# Patient Record
Sex: FEMALE | Race: WHITE | NOT HISPANIC OR LATINO | Employment: OTHER | URBAN - METROPOLITAN AREA
[De-identification: names, ages, dates, MRNs, and addresses within clinical notes are randomized per-mention and may not be internally consistent; named-entity substitution may affect disease eponyms.]

---

## 2017-02-17 ENCOUNTER — HOSPITAL ENCOUNTER (OUTPATIENT)
Dept: CARDIOLOGY | Facility: HOSPITAL | Age: 62
Discharge: HOME | End: 2017-02-17
Attending: SURGERY | Admitting: SURGERY

## 2017-05-26 ENCOUNTER — TRANSCRIBE ORDERS (OUTPATIENT)
Dept: ADMINISTRATIVE | Facility: HOSPITAL | Age: 62
End: 2017-05-26

## 2017-05-26 ENCOUNTER — LAB (OUTPATIENT)
Dept: LAB | Facility: HOSPITAL | Age: 62
End: 2017-05-26
Attending: SURGERY

## 2017-05-26 ENCOUNTER — HOSPITAL ENCOUNTER (OUTPATIENT)
Dept: CARDIOLOGY | Facility: HOSPITAL | Age: 62
Discharge: HOME | End: 2017-05-26
Attending: SURGERY | Admitting: SURGERY

## 2017-05-26 DIAGNOSIS — Z01.812 PRE-OPERATIVE LABORATORY EXAMINATION: ICD-10-CM

## 2017-05-26 DIAGNOSIS — Z01.812 PRE-OPERATIVE LABORATORY EXAMINATION: Primary | ICD-10-CM

## 2017-05-26 LAB
BASOPHILS # BLD AUTO: 0.03 10*3/MM3 (ref 0–0.2)
BASOPHILS NFR BLD AUTO: 0.5 % (ref 0–1.5)
DEPRECATED RDW RBC AUTO: 41.4 FL (ref 37–54)
EOSINOPHIL # BLD AUTO: 0.29 10*3/MM3 (ref 0–0.7)
EOSINOPHIL NFR BLD AUTO: 4.7 % (ref 0.3–6.2)
ERYTHROCYTE [DISTWIDTH] IN BLOOD BY AUTOMATED COUNT: 13.2 % (ref 11.7–13)
HCT VFR BLD AUTO: 41.4 % (ref 35.6–45.5)
HGB BLD-MCNC: 13.5 G/DL (ref 11.9–15.5)
IMM GRANULOCYTES # BLD: 0.01 10*3/MM3 (ref 0–0.03)
IMM GRANULOCYTES NFR BLD: 0.2 % (ref 0–0.5)
LYMPHOCYTES # BLD AUTO: 2.07 10*3/MM3 (ref 0.9–4.8)
LYMPHOCYTES NFR BLD AUTO: 33.7 % (ref 19.6–45.3)
MCH RBC QN AUTO: 28.3 PG (ref 26.9–32)
MCHC RBC AUTO-ENTMCNC: 32.6 G/DL (ref 32.4–36.3)
MCV RBC AUTO: 86.8 FL (ref 80.5–98.2)
MONOCYTES # BLD AUTO: 0.46 10*3/MM3 (ref 0.2–1.2)
MONOCYTES NFR BLD AUTO: 7.5 % (ref 5–12)
NEUTROPHILS # BLD AUTO: 3.28 10*3/MM3 (ref 1.9–8.1)
NEUTROPHILS NFR BLD AUTO: 53.4 % (ref 42.7–76)
NRBC BLD MANUAL-RTO: 0 /100 WBC (ref 0–0)
PLATELET # BLD AUTO: 120 10*3/MM3 (ref 140–500)
PMV BLD AUTO: 9.5 FL (ref 6–12)
RBC # BLD AUTO: 4.77 10*6/MM3 (ref 3.9–5.2)
WBC NRBC COR # BLD: 6.14 10*3/MM3 (ref 4.5–10.7)

## 2017-05-26 PROCEDURE — 93010 ELECTROCARDIOGRAM REPORT: CPT | Performed by: INTERNAL MEDICINE

## 2017-05-26 PROCEDURE — 93005 ELECTROCARDIOGRAM TRACING: CPT | Performed by: SURGERY

## 2017-05-26 PROCEDURE — 85025 COMPLETE CBC W/AUTO DIFF WBC: CPT

## 2017-05-26 PROCEDURE — 36415 COLL VENOUS BLD VENIPUNCTURE: CPT

## 2017-10-10 ENCOUNTER — HOSPITAL ENCOUNTER (OUTPATIENT)
Dept: CARDIOLOGY | Facility: HOSPITAL | Age: 62
Discharge: HOME | End: 2017-10-10
Attending: SURGERY | Admitting: SURGERY

## 2017-10-10 ENCOUNTER — LAB (OUTPATIENT)
Dept: LAB | Facility: HOSPITAL | Age: 62
End: 2017-10-10
Attending: SURGERY

## 2017-10-10 ENCOUNTER — TRANSCRIBE ORDERS (OUTPATIENT)
Dept: ADMINISTRATIVE | Facility: HOSPITAL | Age: 62
End: 2017-10-10

## 2017-10-10 DIAGNOSIS — I83.819 VARICOSE VEINS OF LOWER EXTREMITY WITH PAIN, UNSPECIFIED LATERALITY: Primary | ICD-10-CM

## 2017-10-10 DIAGNOSIS — I83.819 VARICOSE VEINS OF LOWER EXTREMITY WITH PAIN, UNSPECIFIED LATERALITY: ICD-10-CM

## 2017-10-10 DIAGNOSIS — Z01.818 PRE-OP TESTING: ICD-10-CM

## 2017-10-10 LAB
BASOPHILS # BLD AUTO: 0.04 10*3/MM3 (ref 0–0.2)
BASOPHILS NFR BLD AUTO: 0.7 % (ref 0–1.5)
DEPRECATED RDW RBC AUTO: 41.1 FL (ref 37–54)
EOSINOPHIL # BLD AUTO: 0.14 10*3/MM3 (ref 0–0.7)
EOSINOPHIL NFR BLD AUTO: 2.4 % (ref 0.3–6.2)
ERYTHROCYTE [DISTWIDTH] IN BLOOD BY AUTOMATED COUNT: 13.2 % (ref 11.7–13)
HCT VFR BLD AUTO: 42.2 % (ref 35.6–45.5)
HGB BLD-MCNC: 14 G/DL (ref 11.9–15.5)
IMM GRANULOCYTES # BLD: 0.01 10*3/MM3 (ref 0–0.03)
IMM GRANULOCYTES NFR BLD: 0.2 % (ref 0–0.5)
LYMPHOCYTES # BLD AUTO: 2.15 10*3/MM3 (ref 0.9–4.8)
LYMPHOCYTES NFR BLD AUTO: 37.2 % (ref 19.6–45.3)
MCH RBC QN AUTO: 28.3 PG (ref 26.9–32)
MCHC RBC AUTO-ENTMCNC: 33.2 G/DL (ref 32.4–36.3)
MCV RBC AUTO: 85.4 FL (ref 80.5–98.2)
MONOCYTES # BLD AUTO: 0.48 10*3/MM3 (ref 0.2–1.2)
MONOCYTES NFR BLD AUTO: 8.3 % (ref 5–12)
NEUTROPHILS # BLD AUTO: 2.96 10*3/MM3 (ref 1.9–8.1)
NEUTROPHILS NFR BLD AUTO: 51.2 % (ref 42.7–76)
NRBC BLD MANUAL-RTO: 0 /100 WBC (ref 0–0)
PLATELET # BLD AUTO: 129 10*3/MM3 (ref 140–500)
PMV BLD AUTO: 9.7 FL (ref 6–12)
RBC # BLD AUTO: 4.94 10*6/MM3 (ref 3.9–5.2)
WBC NRBC COR # BLD: 5.78 10*3/MM3 (ref 4.5–10.7)

## 2017-10-10 PROCEDURE — 85025 COMPLETE CBC W/AUTO DIFF WBC: CPT

## 2017-10-10 PROCEDURE — 36415 COLL VENOUS BLD VENIPUNCTURE: CPT

## 2017-10-10 PROCEDURE — 93010 ELECTROCARDIOGRAM REPORT: CPT | Performed by: INTERNAL MEDICINE

## 2017-10-10 PROCEDURE — 93005 ELECTROCARDIOGRAM TRACING: CPT | Performed by: SURGERY

## 2018-04-03 ENCOUNTER — ON CAMPUS - OUTPATIENT (AMBULATORY)
Dept: URBAN - METROPOLITAN AREA HOSPITAL 2 | Facility: HOSPITAL | Age: 63
End: 2018-04-03
Payer: COMMERCIAL

## 2018-04-03 ENCOUNTER — OFFICE (AMBULATORY)
Dept: URBAN - METROPOLITAN AREA PATHOLOGY 4 | Facility: PATHOLOGY | Age: 63
End: 2018-04-03

## 2018-04-03 VITALS
DIASTOLIC BLOOD PRESSURE: 110 MMHG | SYSTOLIC BLOOD PRESSURE: 106 MMHG | HEART RATE: 103 BPM | SYSTOLIC BLOOD PRESSURE: 98 MMHG | HEART RATE: 78 BPM | DIASTOLIC BLOOD PRESSURE: 76 MMHG | OXYGEN SATURATION: 96 % | SYSTOLIC BLOOD PRESSURE: 135 MMHG | HEART RATE: 79 BPM | SYSTOLIC BLOOD PRESSURE: 151 MMHG | DIASTOLIC BLOOD PRESSURE: 66 MMHG | RESPIRATION RATE: 16 BRPM | OXYGEN SATURATION: 98 % | DIASTOLIC BLOOD PRESSURE: 81 MMHG | HEART RATE: 101 BPM | OXYGEN SATURATION: 100 % | OXYGEN SATURATION: 97 % | DIASTOLIC BLOOD PRESSURE: 54 MMHG | SYSTOLIC BLOOD PRESSURE: 158 MMHG | OXYGEN SATURATION: 94 % | DIASTOLIC BLOOD PRESSURE: 75 MMHG | RESPIRATION RATE: 18 BRPM | DIASTOLIC BLOOD PRESSURE: 101 MMHG | HEART RATE: 72 BPM | DIASTOLIC BLOOD PRESSURE: 55 MMHG | OXYGEN SATURATION: 99 % | SYSTOLIC BLOOD PRESSURE: 114 MMHG | HEART RATE: 69 BPM | WEIGHT: 209 LBS | SYSTOLIC BLOOD PRESSURE: 105 MMHG | DIASTOLIC BLOOD PRESSURE: 38 MMHG | SYSTOLIC BLOOD PRESSURE: 101 MMHG | SYSTOLIC BLOOD PRESSURE: 137 MMHG | DIASTOLIC BLOOD PRESSURE: 53 MMHG | SYSTOLIC BLOOD PRESSURE: 194 MMHG | DIASTOLIC BLOOD PRESSURE: 86 MMHG | HEART RATE: 83 BPM | SYSTOLIC BLOOD PRESSURE: 111 MMHG | SYSTOLIC BLOOD PRESSURE: 130 MMHG | HEIGHT: 65 IN | HEART RATE: 74 BPM | HEART RATE: 68 BPM | RESPIRATION RATE: 20 BRPM | TEMPERATURE: 98 F

## 2018-04-03 DIAGNOSIS — Z86.010 PERSONAL HISTORY OF COLONIC POLYPS: ICD-10-CM

## 2018-04-03 DIAGNOSIS — D12.2 BENIGN NEOPLASM OF ASCENDING COLON: ICD-10-CM

## 2018-04-03 DIAGNOSIS — K29.50 UNSPECIFIED CHRONIC GASTRITIS WITHOUT BLEEDING: ICD-10-CM

## 2018-04-03 DIAGNOSIS — D12.3 BENIGN NEOPLASM OF TRANSVERSE COLON: ICD-10-CM

## 2018-04-03 DIAGNOSIS — K21.0 GASTRO-ESOPHAGEAL REFLUX DISEASE WITH ESOPHAGITIS: ICD-10-CM

## 2018-04-03 DIAGNOSIS — K64.8 OTHER HEMORRHOIDS: ICD-10-CM

## 2018-04-03 DIAGNOSIS — K57.30 DIVERTICULOSIS OF LARGE INTESTINE WITHOUT PERFORATION OR ABS: ICD-10-CM

## 2018-04-03 DIAGNOSIS — D12.5 BENIGN NEOPLASM OF SIGMOID COLON: ICD-10-CM

## 2018-04-03 PROBLEM — K20.9 ESOPHAGITIS, UNSPECIFIED: Status: ACTIVE | Noted: 2018-04-03

## 2018-04-03 PROBLEM — K29.70 GASTRITIS, UNSPECIFIED, WITHOUT BLEEDING: Status: ACTIVE | Noted: 2018-04-03

## 2018-04-03 LAB
GI HISTOLOGY: A. SELECT: (no result)
GI HISTOLOGY: B. UNSPECIFIED: (no result)
GI HISTOLOGY: C. UNSPECIFIED: (no result)
GI HISTOLOGY: D. UNSPECIFIED: (no result)
GI HISTOLOGY: E. UNSPECIFIED: (no result)
GI HISTOLOGY: PDF REPORT: (no result)

## 2018-04-03 PROCEDURE — 45385 COLONOSCOPY W/LESION REMOVAL: CPT | Mod: 33 | Performed by: INTERNAL MEDICINE

## 2018-04-03 PROCEDURE — 88305 TISSUE EXAM BY PATHOLOGIST: CPT | Mod: 33 | Performed by: INTERNAL MEDICINE

## 2018-04-03 PROCEDURE — 43239 EGD BIOPSY SINGLE/MULTIPLE: CPT | Mod: 59 | Performed by: INTERNAL MEDICINE

## 2018-04-03 RX ORDER — OMEPRAZOLE 20 MG/1
20 CAPSULE, DELAYED RELEASE ORAL
Qty: 30 | Refills: 11 | Status: ACTIVE
Start: 2018-04-03

## 2018-04-03 RX ADMIN — PROPOFOL: 10 INJECTION, EMULSION INTRAVENOUS at 10:50

## 2018-06-05 ENCOUNTER — OFFICE (AMBULATORY)
Dept: URBAN - METROPOLITAN AREA CLINIC 64 | Facility: CLINIC | Age: 63
End: 2018-06-05

## 2018-06-05 VITALS
WEIGHT: 200 LBS | DIASTOLIC BLOOD PRESSURE: 71 MMHG | HEIGHT: 65 IN | HEART RATE: 75 BPM | SYSTOLIC BLOOD PRESSURE: 136 MMHG

## 2018-06-05 DIAGNOSIS — K62.5 HEMORRHAGE OF ANUS AND RECTUM: ICD-10-CM

## 2018-06-05 PROCEDURE — 99213 OFFICE O/P EST LOW 20 MIN: CPT | Performed by: NURSE PRACTITIONER

## 2018-06-05 RX ORDER — HYDROCORTISONE ACETATE AND PRAMOXINE HYDROCHLORIDE 25; 10 MG/G; MG/G
CREAM TOPICAL
Qty: 1 | Refills: 2 | Status: ACTIVE
Start: 2018-06-05

## 2018-07-11 ENCOUNTER — OFFICE (AMBULATORY)
Dept: URBAN - METROPOLITAN AREA CLINIC 64 | Facility: CLINIC | Age: 63
End: 2018-07-11

## 2018-07-11 VITALS
HEART RATE: 72 BPM | DIASTOLIC BLOOD PRESSURE: 84 MMHG | SYSTOLIC BLOOD PRESSURE: 136 MMHG | WEIGHT: 211 LBS | HEIGHT: 65 IN

## 2018-07-11 DIAGNOSIS — K62.5 HEMORRHAGE OF ANUS AND RECTUM: ICD-10-CM

## 2018-07-11 PROCEDURE — 99212 OFFICE O/P EST SF 10 MIN: CPT | Performed by: INTERNAL MEDICINE

## 2019-06-04 ENCOUNTER — HOSPITAL ENCOUNTER (OUTPATIENT)
Dept: FAMILY MEDICINE CLINIC | Facility: CLINIC | Age: 64
Setting detail: SPECIMEN
Discharge: HOME | End: 2019-06-04
Attending: FAMILY MEDICINE | Admitting: FAMILY MEDICINE

## 2019-06-04 LAB
ALBUMIN SERPL-MCNC: 4 G/DL (ref 3.5–4.8)
ALBUMIN/GLOB SERPL: 1.5 {RATIO} (ref 1–1.7)
ALP SERPL-CCNC: 87 IU/L (ref 32–91)
ALT SERPL-CCNC: 41 IU/L (ref 14–54)
ANION GAP SERPL CALC-SCNC: 12.6 MMOL/L (ref 10–20)
AST SERPL-CCNC: 32 IU/L (ref 15–41)
BASOPHILS # BLD AUTO: 0 10*3/UL (ref 0–0.2)
BASOPHILS NFR BLD AUTO: 1 % (ref 0–2)
BILIRUB SERPL-MCNC: 1 MG/DL (ref 0.3–1.2)
BUN SERPL-MCNC: 12 MG/DL (ref 8–20)
BUN/CREAT SERPL: 20 (ref 5.4–26.2)
CALCIUM SERPL-MCNC: 10.7 MG/DL (ref 8.9–10.3)
CHLORIDE SERPL-SCNC: 108 MMOL/L (ref 101–111)
CHOLEST SERPL-MCNC: 116 MG/DL
CHOLEST/HDLC SERPL: 1.5 {RATIO}
CONV CO2: 24 MMOL/L (ref 22–32)
CONV LDL CHOLESTEROL DIRECT: 31 MG/DL (ref 0–100)
CONV MICROALBUM.,U,RANDOM: 4 MG/L
CONV TOTAL PROTEIN: 6.6 G/DL (ref 6.1–7.9)
CREAT 24H UR-MCNC: 121.8 MG/DL
CREAT UR-MCNC: 0.6 MG/DL (ref 0.4–1)
DIFFERENTIAL METHOD BLD: (no result)
EOSINOPHIL # BLD AUTO: 0.1 10*3/UL (ref 0–0.3)
EOSINOPHIL # BLD AUTO: 2 % (ref 0–3)
ERYTHROCYTE [DISTWIDTH] IN BLOOD BY AUTOMATED COUNT: 14.2 % (ref 11.5–14.5)
GLOBULIN UR ELPH-MCNC: 2.6 G/DL (ref 2.5–3.8)
GLUCOSE SERPL-MCNC: 106 MG/DL (ref 65–99)
HCT VFR BLD AUTO: 42 % (ref 35–49)
HDLC SERPL-MCNC: 76 MG/DL
HGB BLD-MCNC: 13.8 G/DL (ref 12–15)
LDLC/HDLC SERPL: 0.4 {RATIO}
LIPID INTERPRETATION: NORMAL
LYMPHOCYTES # BLD AUTO: 2.3 10*3/UL (ref 0.8–4.8)
LYMPHOCYTES NFR BLD AUTO: 39 % (ref 18–42)
MCH RBC QN AUTO: 28.1 PG (ref 26–32)
MCHC RBC AUTO-ENTMCNC: 33 G/DL (ref 32–36)
MCV RBC AUTO: 85.2 FL (ref 80–94)
MICROALBUMIN/CREAT UR: 3.3 UG/MG
MONOCYTES # BLD AUTO: 0.3 10*3/UL (ref 0.1–1.3)
MONOCYTES NFR BLD AUTO: 6 % (ref 2–11)
NEUTROPHILS # BLD AUTO: 3 10*3/UL (ref 2.3–8.6)
NEUTROPHILS NFR BLD AUTO: 52 % (ref 50–75)
NRBC BLD AUTO-RTO: 0 /100{WBCS}
NRBC/RBC NFR BLD MANUAL: 0 10*3/UL
PLATELET # BLD AUTO: 160 10*3/UL (ref 150–450)
PMV BLD AUTO: 8.4 FL (ref 7.4–10.4)
POTASSIUM SERPL-SCNC: 4.6 MMOL/L (ref 3.6–5.1)
RBC # BLD AUTO: 4.93 10*6/UL (ref 4–5.4)
SODIUM SERPL-SCNC: 140 MMOL/L (ref 136–144)
T4 FREE SERPL-MCNC: 1.03 NG/DL (ref 0.58–1.64)
TRIGL SERPL-MCNC: 51 MG/DL
TSH SERPL-ACNC: 1.48 UIU/ML (ref 0.34–5.6)
VIT B12 SERPL-MCNC: 245 PG/ML (ref 180–914)
VLDLC SERPL CALC-MCNC: 8.8 MG/DL
WBC # BLD AUTO: 5.8 10*3/UL (ref 4.5–11.5)

## 2019-06-05 LAB — 25(OH)D3 SERPL-MCNC: 28 NG/ML (ref 30–100)

## 2019-07-16 RX ORDER — BIMATOPROST 3 UG/ML
SOLUTION TOPICAL
Qty: 3 ML | Refills: 3 | Status: SHIPPED | OUTPATIENT
Start: 2019-07-16 | End: 2020-07-27 | Stop reason: SDUPTHER

## 2019-07-17 ENCOUNTER — OFFICE VISIT (OUTPATIENT)
Dept: FAMILY MEDICINE CLINIC | Facility: CLINIC | Age: 64
End: 2019-07-17

## 2019-07-17 VITALS
DIASTOLIC BLOOD PRESSURE: 89 MMHG | OXYGEN SATURATION: 100 % | RESPIRATION RATE: 18 BRPM | WEIGHT: 204 LBS | SYSTOLIC BLOOD PRESSURE: 179 MMHG | HEART RATE: 72 BPM | BODY MASS INDEX: 36.14 KG/M2 | HEIGHT: 63 IN | TEMPERATURE: 98.4 F

## 2019-07-17 DIAGNOSIS — I15.2 HYPERTENSION DUE TO ENDOCRINE DISORDER: ICD-10-CM

## 2019-07-17 DIAGNOSIS — E11.8 TYPE 2 DIABETES MELLITUS WITH COMPLICATION, UNSPECIFIED WHETHER LONG TERM INSULIN USE: Primary | ICD-10-CM

## 2019-07-17 DIAGNOSIS — Z87.19 HISTORY OF ABDOMINAL HERNIA: ICD-10-CM

## 2019-07-17 DIAGNOSIS — E66.01 CLASS 2 SEVERE OBESITY DUE TO EXCESS CALORIES WITH SERIOUS COMORBIDITY AND BODY MASS INDEX (BMI) OF 36.0 TO 36.9 IN ADULT (HCC): ICD-10-CM

## 2019-07-17 PROBLEM — K59.09 CONSTIPATION, CHRONIC: Status: ACTIVE | Noted: 2019-07-17

## 2019-07-17 PROBLEM — E53.8 DEFICIENCY OF OTHER SPECIFIED B GROUP VITAMINS: Status: ACTIVE | Noted: 2019-07-17

## 2019-07-17 PROBLEM — T50.905A ADVERSE DRUG REACTION: Status: ACTIVE | Noted: 2019-07-17

## 2019-07-17 PROBLEM — Z98.890 OTHER SPECIFIED POSTPROCEDURAL STATES: Status: ACTIVE | Noted: 2019-06-04

## 2019-07-17 PROBLEM — K76.0 STEATOSIS OF LIVER: Status: ACTIVE | Noted: 2019-07-17

## 2019-07-17 PROBLEM — K21.9 GASTROESOPHAGEAL REFLUX DISEASE: Status: ACTIVE | Noted: 2019-03-05

## 2019-07-17 PROBLEM — I10 HYPERTENSION: Status: ACTIVE | Noted: 2019-07-17

## 2019-07-17 PROBLEM — Z78.0 POSTMENOPAUSAL: Status: ACTIVE | Noted: 2019-06-04

## 2019-07-17 PROBLEM — E78.5 DYSLIPIDEMIA: Status: ACTIVE | Noted: 2019-06-04

## 2019-07-17 PROBLEM — N89.3 DYSPLASIA OF VAGINA: Status: ACTIVE | Noted: 2019-07-17

## 2019-07-17 PROBLEM — E03.9 HYPOTHYROIDISM: Status: ACTIVE | Noted: 2019-02-22

## 2019-07-17 PROBLEM — E11.9 TYPE 2 DIABETES MELLITUS (HCC): Status: ACTIVE | Noted: 2019-07-17

## 2019-07-17 LAB — GLUCOSE BLDC GLUCOMTR-MCNC: 114 MG/DL (ref 70–130)

## 2019-07-17 PROCEDURE — 82962 GLUCOSE BLOOD TEST: CPT | Performed by: FAMILY MEDICINE

## 2019-07-17 PROCEDURE — 99214 OFFICE O/P EST MOD 30 MIN: CPT | Performed by: FAMILY MEDICINE

## 2019-07-17 RX ORDER — AMLODIPINE BESYLATE 2.5 MG/1
1 TABLET ORAL EVERY 24 HOURS
COMMUNITY
Start: 2019-03-18 | End: 2019-11-14 | Stop reason: SDUPTHER

## 2019-07-17 RX ORDER — LOSARTAN POTASSIUM 50 MG/1
TABLET ORAL
COMMUNITY
Start: 2019-07-10 | End: 2019-08-05 | Stop reason: SDUPTHER

## 2019-07-17 RX ORDER — ASPIRIN 81 MG/1
81 TABLET ORAL DAILY
COMMUNITY
Start: 2012-03-19

## 2019-07-17 RX ORDER — LEVOTHYROXINE SODIUM 112 MCG
TABLET ORAL
COMMUNITY
Start: 2019-06-27 | End: 2019-10-24 | Stop reason: ALTCHOICE

## 2019-07-17 RX ORDER — METFORMIN HYDROCHLORIDE 500 MG/1
1 TABLET, EXTENDED RELEASE ORAL EVERY 24 HOURS
COMMUNITY
Start: 2019-03-05 | End: 2019-12-12 | Stop reason: SDUPTHER

## 2019-08-05 RX ORDER — LOSARTAN POTASSIUM 50 MG/1
50 TABLET ORAL 2 TIMES DAILY
Qty: 60 TABLET | Refills: 3 | Status: SHIPPED | OUTPATIENT
Start: 2019-08-05 | End: 2019-11-01 | Stop reason: ALTCHOICE

## 2019-08-11 PROBLEM — I15.2 HYPERTENSION DUE TO ENDOCRINE DISORDER: Status: ACTIVE | Noted: 2019-08-11

## 2019-08-11 PROBLEM — Z87.19 HISTORY OF ABDOMINAL HERNIA: Status: ACTIVE | Noted: 2019-08-11

## 2019-08-11 NOTE — PROGRESS NOTES
Subjective   Jack Hidalgo is a 63 y.o. female.    and is doing well  Problem   Hypertension Due to Endocrine Disorder   History of Abdominal Hernia   Class 2 Severe Obesity Due to Excess Calories With Serious Comorbidity and Body Mass Index (Bmi) of 36.0 to 36.9 in Adult (Cms/Hcc)     History of Present Illness   Pt recently had repair of previous abdominal hernia surgery  DM doing well with current treatment  Glucose gotky=102    The following portions of the patient's history were reviewed and updated as appropriate: allergies, current medications, past family history, past medical history, past social history, past surgical history and problem list.    Past Medical History:   Diagnosis Date   • B12 deficiency    • Biliary dyskinesia 09/2012   • Chronic constipation    • Colon polyps    • Fatty liver disease, nonalcoholic     Abstraction from [employer] Fatty [facility]   • GERD (gastroesophageal reflux disease)    • Hearing loss, bilateral     Bilateral hearing aids   • Hypertension    • Hypothyroidism    • Morbid obesity (CMS/HCC)    • Type 2 diabetes mellitus (CMS/HCC)    • Vulvar intraepithelial neoplasia ([facility]NE[facility])     Her GYN is Dr Garcia       Past Surgical History:   Procedure Laterality Date   • BLADDER REPAIR     • BREAST SURGERY      Breast Lift   • CHOLECYSTECTOMY  09/28/2012    Lap   • COLONOSCOPY      with 9 polyps 2 were pre cancerous Dr Guzman   • HERNIA REPAIR  2018    Double Hernias- [facility]   • HYSTERECTOMY     • LIPOMA EXCISION      Lipoma & Sub Cyst   • LIPOSUCTION     • OTHER SURGICAL HISTORY  2011    Vaginal Ablation ([facility])   • OTHER SURGICAL HISTORY Right 2012    Leg Ablation   • VEIN SURGERY      Vein Removal       Family History   Problem Relation Age of Onset   • Cancer Mother         Colon Cancer   • Diabetes Father         DMII   • Kidney disease Father    • Liver disease Sister    • Other Brother         MRSA   • Cancer Brother         Facial and Neck Cancer   •  Rheumatic fever Other    • Hypertension Other        Review of Systems   Constitutional: Negative for fatigue, unexpected weight gain and unexpected weight loss.   HENT: Negative for congestion, sinus pressure and sore throat.    Eyes: Negative for blurred vision and visual disturbance.   Respiratory: Negative for cough, shortness of breath and wheezing.    Cardiovascular: Negative for chest pain, palpitations and leg swelling.   Gastrointestinal: Negative for abdominal pain, constipation, diarrhea, nausea, vomiting, GERD and indigestion.   Musculoskeletal: Negative for arthralgias, back pain, gait problem, joint swelling and neck pain.   Skin: Negative for rash, skin lesions and bruise.   Allergic/Immunologic: Negative for environmental allergies.   Neurological: Negative for dizziness, tremors, syncope, weakness, light-headedness, headache and memory problem.   Psychiatric/Behavioral: Negative for sleep disturbance, depressed mood and stress. The patient is not nervous/anxious.        Objective   Physical Exam   Constitutional: She is oriented to person, place, and time. She appears well-developed and well-nourished. No distress.   obese   HENT:   Head: Normocephalic and atraumatic.   Right Ear: External ear normal.   Left Ear: External ear normal.   Nose: Nose normal.   Mouth/Throat: Oropharynx is clear and moist.   Eyes: EOM are normal. Pupils are equal, round, and reactive to light.   Neck: Normal range of motion. Neck supple. No thyromegaly present.   Cardiovascular: Normal rate, regular rhythm and normal heart sounds. Exam reveals no gallop and no friction rub.   No murmur heard.  Pulmonary/Chest: Effort normal. She has no wheezes.   Abdominal: Soft. There is no tenderness.   Healing post surgical wound   Musculoskeletal: Normal range of motion. She exhibits no edema, tenderness or deformity.   Lymphadenopathy:     She has no cervical adenopathy.   Neurological: She is alert and oriented to person, place, and  time. No cranial nerve deficit.   Skin: Skin is warm and dry. No rash noted. She is not diaphoretic.   Psychiatric: She has a normal mood and affect. Her behavior is normal. Judgment and thought content normal.   Nursing note and vitals reviewed.        Assessment/Plan   Jack was seen today for follow-up and diabetes.    Diagnoses and all orders for this visit:    Type 2 diabetes mellitus with complication, unspecified whether long term insulin use (CMS/[facility])  -     POCT Glucose    History of abdominal hernia  -     CT Abd With Contrast Pelvis With & Without Contrast    Hypertension due to endocrine disorder    Class 2 severe obesity due to excess calories with serious comorbidity and body mass index (BMI) of 36.0 to 36.9 in adult (CMS/[facility])          Keep fu with surgeon  Continue diabetes control  Refill meds as needed  Recommend weight loss for good health     Chief Complaint   Patient presents with   • Follow-up     follow up from hernia surgery   • Diabetes     Vitals:    07/17/19 1539   BP: 179/89   Pulse: 72   Resp: 18   Temp: 98.4 °F (36.9 °C)   SpO2: 100%     Glucose   Date Value Ref Range Status   07/17/2019 114 70 - 130 mg/dL Final     LDL Cholesterol    Date Value Ref Range Status   06/04/2019 31 0 - 100 mg/dL Final

## 2019-09-17 DIAGNOSIS — Z87.19 HISTORY OF ABDOMINAL HERNIA: Primary | ICD-10-CM

## 2019-09-19 ENCOUNTER — OFFICE VISIT (OUTPATIENT)
Dept: FAMILY MEDICINE CLINIC | Facility: CLINIC | Age: 64
End: 2019-09-19

## 2019-09-19 VITALS
BODY MASS INDEX: 36.86 KG/M2 | HEART RATE: 66 BPM | SYSTOLIC BLOOD PRESSURE: 183 MMHG | OXYGEN SATURATION: 99 % | WEIGHT: 208 LBS | HEIGHT: 63 IN | DIASTOLIC BLOOD PRESSURE: 92 MMHG | RESPIRATION RATE: 17 BRPM | TEMPERATURE: 97 F

## 2019-09-19 DIAGNOSIS — Z13.820 OSTEOPOROSIS SCREENING: ICD-10-CM

## 2019-09-19 DIAGNOSIS — E11.8 TYPE 2 DIABETES MELLITUS WITH COMPLICATION, UNSPECIFIED WHETHER LONG TERM INSULIN USE: Primary | ICD-10-CM

## 2019-09-19 PROBLEM — R10.32 LEFT LOWER QUADRANT PAIN: Status: ACTIVE | Noted: 2019-09-19

## 2019-09-19 PROBLEM — R10.9 LEFT SIDED ABDOMINAL PAIN: Status: ACTIVE | Noted: 2019-09-19

## 2019-09-19 LAB
GLUCOSE BLDC GLUCOMTR-MCNC: 114 MG/DL (ref 70–130)
HBA1C MFR BLD: 7.1 %

## 2019-09-19 PROCEDURE — 83036 HEMOGLOBIN GLYCOSYLATED A1C: CPT | Performed by: NURSE PRACTITIONER

## 2019-09-19 PROCEDURE — 99213 OFFICE O/P EST LOW 20 MIN: CPT | Performed by: NURSE PRACTITIONER

## 2019-09-19 PROCEDURE — 82962 GLUCOSE BLOOD TEST: CPT | Performed by: NURSE PRACTITIONER

## 2019-10-02 NOTE — PROGRESS NOTES
Chief Complaint   Patient presents with   • Follow-up   • Hypertension   • Diabetes        Subjective   Jack Hidalgo is a 63 y.o.  female who presents today for   Pt reports that she is having continued left lower abdominal pain. Pt also reports that she is concerned she is going to have a bowel obstruction because of pain. This is concerning as she has had history of  Abdominal hernias. Will need to obtain a CT scan to assure no bowel obstruction is present. Preventative dexa scan, mammograms and colonscopy discussed with patient reports complaint with these items. Will refer for dexa scan. Reports a medication adjustment was just made by Dr Vallecillo and she is to let us know if these do not drop her blood pressure. Discussed the blood pressure at today visit. Recommendations to monitor these daily and to bring to office in two weeks for evaluation.       Jack Hidalgo  has a past medical history of B12 deficiency, Biliary dyskinesia (09/2012), Chronic constipation, Colon polyps, Fatty liver disease, nonalcoholic, GERD (gastroesophageal reflux disease), Hearing loss, bilateral, Hypertension, Hypothyroidism, Morbid obesity (CMS/HCC), Type 2 diabetes mellitus (CMS/HCC), and Vulvar intraepithelial neoplasia ([facility]Z).    Allergies   Allergen Reactions   • Butorphanol Anaphylaxis   • Cephalexin Hives   • Lorazepam Hives   • Penicillin G Hives       Current Outpatient Medications:   •  amLODIPine (NORVASC) 2.5 MG tablet, 1 tablet Daily., Disp: , Rfl:   •  aspirin (ADULT ASPIRIN EC LOW STRENGTH) 81 MG EC tablet, ADULT ASPIRIN EC LOW STRENGTH 81 MG ORAL TABLET DELAYED RELEASE, Disp: , Rfl:   •  bimatoprost (LATISSE) 0.03 % ophthalmic solution, APPLY A LINE TO UPPER LID ONLY, Disp: 3 mL, Rfl: 3  •  glucose blood (FREESTYLE TEST STRIPS) test strip, FREESTYLE TEST STRP, Disp: , Rfl:   •  losartan (COZAAR) 50 MG tablet, Take 1 tablet by mouth 2 (Two) Times a Day., Disp: 60 tablet, Rfl: 3  •  metFORMIN ER (GLUCOPHAGE-XR)  500 MG 24 hr tablet, 1 tablet Daily., Disp: , Rfl:   •  SYNTHROID 112 MCG tablet, , Disp: , Rfl:   Past Medical History:   Diagnosis Date   • B12 deficiency    • Biliary dyskinesia 09/2012   • Chronic constipation    • Colon polyps    • Fatty liver disease, nonalcoholic    • GERD (gastroesophageal reflux disease)    • Hearing loss, bilateral    • Hypertension    • Hypothyroidism    • Morbid obesity (CMS/HCC)    • Type 2 diabetes mellitus (CMS/HCC)    • Vulvar intraepithelial neoplasia ([facility]Z)      Past Surgical History:   Procedure Laterality Date   • BLADDER REPAIR     • BREAST SURGERY      Breast Lift   • CHOLECYSTECTOMY  09/28/2012    Lap   • COLONOSCOPY      with 9 polyps 2 were pre cancerous Dr Guzman   • HERNIA REPAIR  2018    Double Hernias- [facility]   • HYSTERECTOMY     • LIPOMA EXCISION      Lipoma & Sub Cyst   • LIPOSUCTION     • OTHER SURGICAL HISTORY  2011    V[facility]inal Ablation ([facility])   • OTHER SURGICAL HISTORY Right 2012    Leg Ablation   • VEIN SURGERY      Vein Removal     Social History     Socioeconomic History   • Marital status:      Spouse name: Not on file   • Number of children: Not on file   • Years of education: Not on file   • Highest education level: Not on file   Tobacco Use   • Smoking status: Never Smoker   • Tobacco comment: Passive Smoke: N   Substance and Sexual Activity   • Drug use: No     Family History   Problem Relation Age of Onset   • Cancer Mother         Colon Cancer   • Diabetes Father         DMII   • Kidney disease Father    • Liver disease Sister    • Other Brother         MRSA   • Cancer Brother         Facial and Neck Cancer   • Rheumatic fever Other    • Hypertension Other      PHQ-2 Depression Screening  Little interest or pleasure in doing things?     Feeling down, depressed, or hopeless?     PHQ-2 Total Score       PHQ-9 Depression Screening  Little interest or pleasure in doing things?     Feeling down, depressed, or hopeless?     Trouble falling  "or staying asleep, or sleeping too much?     Feeling tired or having little energy?     Poor appetite or overeating?     Feeling bad about yourself - or that you are a failure or have let yourself or your family down?     Trouble concentrating on things, such as reading the newspaper or watching television?     Moving or speaking so slowly that other people could have noticed? Or the opposite - being so fidgety or restless that you have been moving around a lot more than usual?     Thoughts that you would be better off dead, or of hurting yourself in some way?     PHQ-9 Total Score     If you checked off any problems, how difficult have these problems made it for you to do your work, take care of things at home, or get along with other people?         Family history, surgical history, past medical history, Allergies and med's reviewed with patient today and updated in [employer] EMR.     ROS:  Review of Systems   Constitutional: Negative for activity change, appetite change and fatigue.   Respiratory: Negative for cough and shortness of breath.    Cardiovascular: Negative for chest pain and leg swelling.   Gastrointestinal: Negative for abdominal pain and nausea.   Endocrine: Negative for polydipsia, polyphagia and polyuria.   Musculoskeletal: Negative for arthralgias, back pain and myalgias.   Skin: Negative for rash.   Neurological: Negative for speech difficulty and headaches.   Psychiatric/Behavioral: Negative for confusion and decreased concentration.       OBJECTIVE:  Vitals:    09/19/19 1149   BP: (!) 183/92   Pulse: 66   Resp: 17   Temp: 97 °F (36.1 °C)   SpO2: 99%   Weight: 94.3 kg (208 lb)   Height: 160 cm (63\")     Body mass index is 36.85 kg/m².  Physical Exam   Constitutional: She is oriented to person, place, and time. She appears well-developed and well-nourished.   HENT:   Head: Normocephalic and atraumatic.   Eyes: Conjunctivae and EOM are normal. Pupils are equal, round, and reactive to light.   Neck: " Normal range of motion. Neck supple.   Cardiovascular: Normal rate, regular rhythm and normal heart sounds.   Pulmonary/Chest: Effort normal and breath sounds normal.   Abdominal: Soft. Bowel sounds are normal.   Musculoskeletal: Normal range of motion.   Neurological: She is alert and oriented to person, place, and time.   Skin: Skin is warm and dry.   Psychiatric: She has a normal mood and affect. Her behavior is normal. Judgment and thought content normal.       ASSESSMENT/ PLAN:    Jack was seen today for follow-up, hypertension and diabetes.    Diagnoses and all orders for this visit:    Type 2 diabetes mellitus with complication, unspecified whether long term insulin use (CMS/[facility])  -     POCT Glucose  -     POC Glycosylated Hemoglobin (Hb A1C)    Osteoporosis screening  -     DEXA Bone Density Axial        Orders Placed Today:     No orders of the defined types were placed in this encounter.       Management Plan:     An After Visit Summary was printed and given to the patient at discharge.    Follow-up: No Follow-up on file.    JW Ellison 10/2/2019 3:27 PM  This note was electronically signed.

## 2019-10-24 ENCOUNTER — OFFICE VISIT (OUTPATIENT)
Dept: FAMILY MEDICINE CLINIC | Facility: CLINIC | Age: 64
End: 2019-10-24

## 2019-10-24 VITALS
DIASTOLIC BLOOD PRESSURE: 89 MMHG | WEIGHT: 207 LBS | OXYGEN SATURATION: 99 % | HEART RATE: 70 BPM | BODY MASS INDEX: 36.67 KG/M2 | SYSTOLIC BLOOD PRESSURE: 151 MMHG | TEMPERATURE: 97.5 F

## 2019-10-24 DIAGNOSIS — R10.9 LEFT SIDED ABDOMINAL PAIN: Primary | ICD-10-CM

## 2019-10-24 PROCEDURE — 99213 OFFICE O/P EST LOW 20 MIN: CPT | Performed by: NURSE PRACTITIONER

## 2019-10-24 RX ORDER — PEN NEEDLE, DIABETIC 32GX 5/32"
NEEDLE, DISPOSABLE MISCELLANEOUS
COMMUNITY
Start: 2019-09-17 | End: 2019-12-12 | Stop reason: SDUPTHER

## 2019-10-24 RX ORDER — LIRAGLUTIDE 6 MG/ML
INJECTION SUBCUTANEOUS
COMMUNITY
Start: 2019-10-22 | End: 2020-02-10

## 2019-10-24 RX ORDER — LEVOTHYROXINE SODIUM 112 UG/1
112 TABLET ORAL DAILY
COMMUNITY
End: 2020-01-20

## 2019-11-01 ENCOUNTER — OFFICE VISIT (OUTPATIENT)
Dept: FAMILY MEDICINE CLINIC | Facility: CLINIC | Age: 64
End: 2019-11-01

## 2019-11-01 VITALS
RESPIRATION RATE: 18 BRPM | HEIGHT: 63 IN | OXYGEN SATURATION: 97 % | DIASTOLIC BLOOD PRESSURE: 83 MMHG | TEMPERATURE: 97.3 F | WEIGHT: 207 LBS | BODY MASS INDEX: 36.68 KG/M2 | SYSTOLIC BLOOD PRESSURE: 167 MMHG | HEART RATE: 70 BPM

## 2019-11-01 DIAGNOSIS — M81.0 AGE RELATED OSTEOPOROSIS, UNSPECIFIED PATHOLOGICAL FRACTURE PRESENCE: Primary | ICD-10-CM

## 2019-11-01 DIAGNOSIS — I10 ESSENTIAL HYPERTENSION: ICD-10-CM

## 2019-11-01 PROCEDURE — 99213 OFFICE O/P EST LOW 20 MIN: CPT | Performed by: NURSE PRACTITIONER

## 2019-11-01 RX ORDER — LISINOPRIL 10 MG/1
10 TABLET ORAL DAILY
Qty: 30 TABLET | Refills: 0 | Status: SHIPPED | OUTPATIENT
Start: 2019-11-01 | End: 2019-11-26 | Stop reason: SDUPTHER

## 2019-11-11 ENCOUNTER — TELEPHONE (OUTPATIENT)
Dept: ORTHOPEDIC SURGERY | Facility: CLINIC | Age: 64
End: 2019-11-11

## 2019-11-11 ENCOUNTER — OFFICE VISIT (OUTPATIENT)
Dept: ORTHOPEDIC SURGERY | Facility: CLINIC | Age: 64
End: 2019-11-11

## 2019-11-11 VITALS
HEIGHT: 66 IN | BODY MASS INDEX: 33.75 KG/M2 | HEART RATE: 72 BPM | SYSTOLIC BLOOD PRESSURE: 138 MMHG | WEIGHT: 210 LBS | DIASTOLIC BLOOD PRESSURE: 84 MMHG

## 2019-11-11 DIAGNOSIS — E55.9 VITAMIN D DEFICIENCY: ICD-10-CM

## 2019-11-11 DIAGNOSIS — Z82.62 FAMILY HX OSTEOPOROSIS: ICD-10-CM

## 2019-11-11 DIAGNOSIS — E83.52 HYPERCALCEMIA: ICD-10-CM

## 2019-11-11 DIAGNOSIS — M81.0 AGE-RELATED OSTEOPOROSIS WITHOUT CURRENT PATHOLOGICAL FRACTURE: Primary | ICD-10-CM

## 2019-11-11 PROBLEM — Z71.89 OTHER SPECIFIED COUNSELING: Status: ACTIVE | Noted: 2019-02-22

## 2019-11-11 PROCEDURE — 99204 OFFICE O/P NEW MOD 45 MIN: CPT | Performed by: PHYSICIAN ASSISTANT

## 2019-11-11 RX ORDER — MELATONIN
2000 DAILY
COMMUNITY
End: 2019-12-12 | Stop reason: SDUPTHER

## 2019-11-11 RX ORDER — BISACODYL 5 MG/1
5 TABLET, DELAYED RELEASE ORAL DAILY PRN
COMMUNITY
End: 2020-11-10 | Stop reason: ALTCHOICE

## 2019-11-11 RX ORDER — RISEDRONATE SODIUM 35 MG/1
35 TABLET, DELAYED RELEASE ORAL WEEKLY
Qty: 4 TABLET | Refills: 11 | Status: SHIPPED | OUTPATIENT
Start: 2019-11-11 | End: 2019-11-19 | Stop reason: ALTCHOICE

## 2019-11-11 NOTE — TELEPHONE ENCOUNTER
Your request has been successfully sent to [employer] for review. You may close this dialog, return to your dashboard, and perform other tasks.  To check for an update later, open this request again from your dashboard.  Your request will be reviewed within 24 hours. If needed, you may contact [employer] at [phone].      Garcia:[id number]

## 2019-11-11 NOTE — PATIENT INSTRUCTIONS
Osteoporosis    Osteoporosis happens when your bones get thin and weak. This can cause your bones to break (fracture) more easily. You can do things at home to make your bones stronger.  Follow these instructions at home:    Activity  · Exercise as told by your doctor. Ask your doctor what activities are safe for you. You should do:  ? Exercises that make your muscles work to hold your body weight up (weight-bearing exercises). These include shayan chi, yoga, and walking.  ? Exercises to make your muscles stronger. One example is lifting weights.  Lifestyle  · Limit alcohol intake to no more than 1 drink a day for nonpregnant women and 2 drinks a day for men. One drink equals 12 oz of beer, 5 oz of wine, or 1½ oz of hard liquor.  · Do not use any products that have nicotine or tobacco in them. These include cigarettes and e-cigarettes. If you need help quitting, ask your doctor.  Preventing falls  · Use tools to help you move around (mobility aids) as needed. These include canes, walkers, scooters, and crutches.  · Keep rooms well-lit and free of clutter.  · Put away things that could make you trip. These include cords and rugs.  · Install safety rails on stairs. Install grab bars in bathrooms.  · Use rubber mats in slippery areas, like bathrooms.  · Wear shoes that:  ? Fit you well.  ? Support your feet.  ? Have closed toes.  ? Have rubber soles or low heels.  · Tell your doctor about all of the medicines you are taking. Some medicines can make you more likely to fall.  General instructions  · Eat plenty of calcium and vitamin D. These nutrients are good for your bones. Good sources of calcium and vitamin D include:  ? Some fatty fish, such as salmon and tuna.  ? Foods that have calcium and vitamin D added to them (fortified foods). For example, some breakfast cereals are fortified with calcium and vitamin D.  ? Egg yolks.  ? Cheese.  ? Liver.  · Take over-the-counter and prescription medicines only as told by your  doctor.  · Keep all follow-up visits as told by your doctor. This is important.  Contact a doctor if:  · You have not been tested (screened) for osteoporosis and you are:  ? A woman who is age 65 or older.  ? A man who is age 70 or older.  Get help right away if:  · You fall.  · You get hurt.  Summary  · Osteoporosis happens when your bones get thin and weak.  · Weak bones can break (fracture) more easily.  · Eat plenty of calcium and vitamin D. These nutrients are good for your bones.  · Tell your doctor about all of the medicines that you take.  This information is not intended to replace advice given to you by your health care provider. Make sure you discuss any questions you have with your health care provider.  Document Released: 03/11/2013 Document Revised: 10/12/2018 Document Reviewed: 10/12/2018  [employer] Interactive Patient Education © 2019 [employer]      Preventing Health Risks of Being Overweight  Maintaining a healthy body weight is an important part of your overall health. Your healthy body weight depends on your age, gender, and height. Being overweight puts you at risk for many health problems, including:  · Heart disease.  · Diabetes.  · Problems sleeping.  · Joint problems.  You can make changes to your diet and lifestyle to prevent these risks. Consider working with a health care provider or a dietitian to make these changes.  What nutrition changes can be made?    · Eat only as much as your body needs. In most cases, this is about 2,000 calories a day, but the amount varies depending on your height, gender, and activity level. Ask your health care provider how many calories you should have each day. Eating more than your body needs on a regular basis can cause you to become overweight or obese.  · Eat slowly, and stop eating when you feel full.  · Choose healthy foods, including:  ? Fruits and vegetables.  ? Lean meats.  ? Low-fat dairy products.  ? High-fiber foods, such as whole grains and  beans.  ? Healthy snacks like vegetable sticks, a piece of fruit, or a small amount of yogurt or cheese.  · Avoid foods and drinks that are high in sugar, salt (sodium), saturated fat, or trans fat. This includes:  ? Many desserts such as candy, cookies, and ice cream.  ? Soda.  ? Fried foods.  ? Processed meats such as hot dogs or lunch meats.  ? Prepackaged snack foods.  What lifestyle changes can be made?    · Exercise for at least 150 minutes a week to prevent weight gain, or as often as recommended by your health care provider. Do moderate-intensity exercise, such as brisk walking.  ? Spread it out by exercising for 30 minutes 5 days a week, or in short 10-minute bursts several times a day.  · Find other ways to stay active and burn calories, such as yard work or a hobby that involves physical activity.  · Get at least 8 hours of sleep each night. When you are well-rested, you are more likely to be active and make healthy choices during the day. To sleep better:  ? Try to go to bed and wake up at about the same time every day.  ? Keep your bedroom dark, quiet, and cool.  ? Make sure that your bed is comfortable.  ? Avoid stimulating activities, such as watching television or exercising, for at least one hour before bedtime.  Why are these changes important?  Eating healthy and being active helps you lose weight and prevent health problems caused by being overweight. Making these changes can also help you manage stress, feel better mentally, and connect with friends and family.  What can happen if changes are not made?  Being overweight can affect you for your entire life. You may develop joint or bone problems that make it painful or difficult for you to play sports or do activities you enjoy. Being overweight puts stress on your heart and lungs and can lead to medical problems like diabetes, heart disease, and sleeping problems.  Where to find support  You can get support for preventing health risks of being  overweight from:  · Your health care provider or a dietitian. They can provide guidance about healthy eating and healthy lifestyle choices.  · Weight loss support groups, online or in-person.  Where to find more information  · MyPlate: www.choosemyplate.gov  ? This an online tool that provides personalized recommendations about foods to eat each day.  · The Centers for Disease Control and Prevention: www.cdc.gov/healthyweight  ? This resource gives tips for managing weight and having an active lifestyle.  Summary  · To prevent unhealthy weight gain, it is important to maintain a healthy diet high in vegetables and whole grains, exercise regularly, and get at least 8 hours of sleep each night.  · Making these changes helps prevent many long-term (chronic) health conditions that can shorten your life, such as diabetes, heart disease, and stroke.  This information is not intended to replace advice given to you by your health care provider. Make sure you discuss any questions you have with your health care provider.  Document Released: 11/14/2018 Document Revised: 11/14/2018 Document Reviewed: 11/14/2018  [email or web address] Interactive Patient Education © 2019 [employer]

## 2019-11-11 NOTE — PROGRESS NOTES
ORTHOPEDIC VISIT    Referring Provider: No ref. provider found  Primary Care Provider: Kayla Mendoza APRN         Subjective:  Chief Complaint:  Chief Complaint   Patient presents with   • Osteoporosis       HPI:  Jack Hidalgo is a 63 y.o. female who presents for initial visit for osteoporosis.  The patient complains of Complains of: Some joint pain.  And denies Denies: generalized bone pain, loss of height and Any fractures over the age of 50.  Their risk factors include risk factors: Vitamin D deficiency, Low sun exposure and Hypercalcemia.  The patient has the following associated illnesses: Associated illnesses: Esophagitis, Gastritis, Hypercalcemia, Vitamin D deficiency and Diabetes mellitus.  Treatment to date has included Treatment to date: Vitamin D supplementation and Weightbearing exercise.  The patient has not taken any osteoporosis medications.  She currently takes 2000 international units of D3 per day.  She does not take any calcium.  She denies any fractures over the age of 50.  She does have a family history of osteoporosis in her father.  She denies any history of cancer or kidney stones.  She has previously used PPIs for her GERD.  She does see a dentist regularly.  Recent labs were reviewed, showing hypercalcemia.  She did have a DEXA scan on 10/28/2019 at [facility] radiology, revealing a T score of -2.8 in the left femoral neck, with FRAX scores 13% and 2.8%.  Referred for consultation by Kayla Mendoza.        Past Medical History:   Diagnosis Date   • B12 deficiency    • Biliary dyskinesia 09/2012   • Chronic constipation    • Colon polyps    • Dyslipidemia 6/4/2019   • Fatty liver disease, nonalcoholic     Abstraction from [employer] [facility]   • Gastroesophageal reflux disease 3/5/2019   • GERD (gastroesophageal reflux disease)    • Hearing loss, bilateral     Bilateral hearing aids   • Hypertension    • Hypothyroidism    • Morbid obesity (CMS/HCC)    • Type 2  diabetes mellitus (CMS/HCC)    • Vulvar intraepithelial neoplasia (MARTÍNEZ)     Her GYN is Dr Garcia       Past Surgical History:   Procedure Laterality Date   • BLADDER REPAIR     • BREAST SURGERY      Breast Lift   • CHOLECYSTECTOMY  09/28/2012    Lap   • COLONOSCOPY      with 9 polyps 2 were pre cancerous Dr Guzman   • HERNIA REPAIR  2018    Double Hernias- [facility]   • HYSTERECTOMY     • LIPOMA EXCISION      Lipoma & Sub Cyst   • LIPOSUCTION     • OTHER SURGICAL HISTORY  2011    Vaginal Ablation ([facility])   • OTHER SURGICAL HISTORY Right 2012    Leg Ablation   • VEIN SURGERY      Vein Removal       Family History   Problem Relation Age of Onset   • Cancer Mother         Colon Cancer   • Diabetes Father         DMII   • Kidney disease Father    • Osteoporosis Father    • Liver disease Sister    • Other Brother         MRSA   • Cancer Brother         Facial and Neck Cancer   • Rheumatic fever Other    • Hypertension Other        Social History     Occupational History   • Not on file   Tobacco Use   • Smoking status: Never Smoker   • Smokeless tobacco: Never Used   • Tobacco comment: Passive Smoke: N   Substance and Sexual Activity   • Alcohol use: No     Frequency: Never   • Drug use: No   • Sexual activity: Not on file        Medications:    Current Outpatient Medications:   •  amLODIPine (NORVASC) 2.5 MG tablet, 1 tablet Daily., Disp: , Rfl:   •  aspirin (ADULT ASPIRIN EC LOW STRENGTH) 81 MG EC tablet, ADULT ASPIRIN EC LOW STRENGTH 81 MG ORAL TABLET DELAYED RELEASE, Disp: , Rfl:   •  BD PEN NEEDLE JENNIFER U/F 32G X 4 MM misc, , Disp: , Rfl:   •  bimatoprost (LATISSE) 0.03 % ophthalmic solution, APPLY A LINE TO UPPER LID ONLY, Disp: 3 mL, Rfl: 3  •  bisacodyl (DULCOLAX) 5 MG EC tablet, Take 5 mg by mouth Daily As Needed for Constipation., Disp: , Rfl:   •  cholecalciferol (VITAMIN D3) 25 MCG (1000 UT) tablet, Take 2,000 Units by mouth Daily., Disp: , Rfl:   •  glucose blood (FREESTYLE TEST STRIPS) test  "strip, FREESTYLE TEST STRP, Disp: , Rfl:   •  levothyroxine (SYNTHROID, LEVOTHROID) 112 MCG tablet, Take 112 mcg by mouth Daily., Disp: , Rfl:   •  lisinopril (PRINIVIL,ZESTRIL) 10 MG tablet, Take 1 tablet by mouth Daily for 30 days., Disp: 30 tablet, Rfl: 0  •  metFORMIN ER (GLUCOPHAGE-XR) 500 MG 24 hr tablet, 1 tablet Daily., Disp: , Rfl:   •  VICTOZA 18 MG/3ML solution pen-injector injection, , Disp: , Rfl:   •  Risedronate Sodium 35 MG tablet delayed-release, Take 35 mg by mouth 1 (One) Time Per Week. Take with water after breakfast & remain upright for 30 minutes, Disp: 4 tablet, Rfl: 11    Allergies:  Allergies   Allergen Reactions   • Butorphanol Anaphylaxis   • Cephalexin Hives   • Lorazepam Hives   • Penicillin G Hives         Review of Systems:  Gen -no fever, chills , sweats, headache   Eyes - no irritation or discharge   ENT -  no ear pain , runny nose , sore throat , difficulty swallowing   Resp - no cough , congestion , excessive expectoration   CVS - no chest pain , palpitations.   Abd - no pain , nausea , vomiting , diarrhea   Skin - no rash , lesions.   Neuro - no dizziness    Please see HPI for any other pertinent positives.  All other systems were reviewed and are negative.       Objective   Objective:    /84   Pulse 72   Ht 166.4 cm (65.5\")   Wt 95.3 kg (210 lb)   BMI 34.41 kg/m²     Physical Examination:  Obese individual in no acute distress, patient is alert and cooperative with the exam, appears to have normal mood and affect with a normal attention span and concentration, ambulating unassisted  normocephalic, atraumatic, extraocular movements intact, conjunctiva and sclera are clear without nystagmus, normal canals with grossly normal hearing, no nasal deformity, discharge, inflammation, or lesions, no mouth deformity or lesions  no lymphadenopathy or thyromegaly  normal respiratory effort  abdomen is nontender, without guarding or rebound and nondistended  no gross joint " abnormalities  pulses normal in all 4 extremities, no clubbing, cyanosis, or edema noted  cranial nerves II-XII grossly intact with no focal defects  skin intact without lesions or rashes visible             Imaging:  no diagnostic testing performed this visit            Assessment:  1. Age-related osteoporosis without current pathological fracture    2. Vitamin D deficiency    3. Hypercalcemia    4. Family hx osteoporosis                 Plan:  Today she will be given orders for CMP, magnesium, phosphorus, PTH, and vitamin D.  We discussed various treatment options today, and the patient would first like to try an oral medication.  She will be started on Atelvia.  We have discussed the risks and benefits of this medication, including but not limited to osteonecrosis of the jaw and atypical femur fractures, and she voiced understanding.  She will also be given an osteoporosis folder containing everything discussed, including weightbearing exercise and fall prevention.  I will plan to see her yearly and as needed.  PATIENT EDUCATION:    Education was provided to: patient and patient's spouse  Patient response: expressed understanding, receptive and May need additional review  Topics discussed: medical condition, workup results, treatment options, therapeutic risks and benefits, medication use, health promotion, diet and nutrition, support systems available, drug interactions, compliance with medication, osteoporosis risks, osteoporosis medications, Atelvia versus Reclast versus Prolia  Informed how: verbally, demonstration, literature  Minutes spent on education: 30             ANCELMO Jonas  11/11/19  2:36 PM

## 2019-11-12 ENCOUNTER — TELEPHONE (OUTPATIENT)
Dept: FAMILY MEDICINE CLINIC | Facility: CLINIC | Age: 64
End: 2019-11-12

## 2019-11-12 NOTE — TELEPHONE ENCOUNTER
Spoke with PT she wants x-rays  Both feet, both legs, left upper back, both hips.  To look for arthritis.

## 2019-11-13 ENCOUNTER — TELEPHONE (OUTPATIENT)
Dept: ORTHOPEDIC SURGERY | Facility: CLINIC | Age: 64
End: 2019-11-13

## 2019-11-13 NOTE — TELEPHONE ENCOUNTER
Unable to approve Unable to approve Risedronate sodium tablet delayed release 35 MG due to unmet criteria (3) as outlined in plan guideline below. Please use the following preferred formulary medication, unless contraindicated: alendronate. Plan guideline CP.PMN.100 (Risedronate (Actonel, Atelvia)) requires all of the following are met prior to consideration of approval: 1. Prescribed for the diagnosis or treatment of osteoporosis; 2. Age 18 years old or older; 3. Failure of alendronate at up to maximum indicated doses, unless contraindicated or clinically significant adverse effects are experienced; 4. Dose does not exceed: a. Actonel 5 mg per day (1 tablet per day); b. Atelvia 35 mg per week (1 tablet per week). Note: If you believe the member has met criteria (3) as described above, please resubmit your request with additional clinically supportive documentation for consideration.

## 2019-11-13 NOTE — TELEPHONE ENCOUNTER
Cover My Meds  Your request has been successfully sent to [employer] for review.     (Key: [id number])

## 2019-11-14 DIAGNOSIS — M25.559 ARTHRALGIA OF HIP, UNSPECIFIED LATERALITY: ICD-10-CM

## 2019-11-14 DIAGNOSIS — M25.50 ARTHRALGIA, UNSPECIFIED JOINT: ICD-10-CM

## 2019-11-14 DIAGNOSIS — M19.90 ARTHRITIS: Primary | ICD-10-CM

## 2019-11-14 NOTE — TELEPHONE ENCOUNTER
Advise patient that if she wants to have these xrays while she is there getting her CT scan this is ok with me.

## 2019-11-15 RX ORDER — AMLODIPINE BESYLATE 2.5 MG/1
TABLET ORAL
Qty: 30 TABLET | Refills: 3 | Status: SHIPPED | OUTPATIENT
Start: 2019-11-15 | End: 2020-03-12 | Stop reason: SDUPTHER

## 2019-11-19 ENCOUNTER — TELEPHONE (OUTPATIENT)
Dept: ORTHOPEDIC SURGERY | Facility: CLINIC | Age: 64
End: 2019-11-19

## 2019-11-19 DIAGNOSIS — E21.3 HYPERPARATHYROIDISM (HCC): ICD-10-CM

## 2019-11-19 DIAGNOSIS — M81.0 AGE-RELATED OSTEOPOROSIS WITHOUT CURRENT PATHOLOGICAL FRACTURE: Primary | ICD-10-CM

## 2019-11-19 DIAGNOSIS — E83.52 HYPERCALCEMIA: ICD-10-CM

## 2019-11-19 RX ORDER — ALENDRONATE SODIUM 70 MG/1
70 TABLET ORAL
Qty: 4 TABLET | Refills: 11 | Status: SHIPPED | OUTPATIENT
Start: 2019-11-19 | End: 2019-12-12 | Stop reason: SDUPTHER

## 2019-11-20 ENCOUNTER — TELEPHONE (OUTPATIENT)
Dept: FAMILY MEDICINE CLINIC | Facility: CLINIC | Age: 64
End: 2019-11-20

## 2019-11-20 DIAGNOSIS — K43.9 ABDOMINAL WALL HERNIA: Primary | ICD-10-CM

## 2019-11-20 NOTE — TELEPHONE ENCOUNTER
Called Pt and informed her of the Rx.  She stated that she had missed your call and wanted the name and number to the referral.  I gave her the [facility] name and number and informed her if she doesn't hear from them in a week to give them a call.

## 2019-11-22 ENCOUNTER — TELEPHONE (OUTPATIENT)
Dept: ENDOCRINOLOGY | Facility: CLINIC | Age: 64
End: 2019-11-22

## 2019-11-22 NOTE — TELEPHONE ENCOUNTER
Attempted to contact pt schedule NP appt with Dr. Rodriguez. Lm on pts vm requesting return call.    11/25- S/w pt. Appt scheduled 1/14 @ 0800 with Dr. Mejia. Pt advised to arrive 30 mins early and bring completed paperwork mailed to her with ins cards, ID and a current med list with dosages. Paperwork mailed and pt given office address and contact info. No records scanned - internal referral from [facility].

## 2019-11-26 ENCOUNTER — TELEPHONE (OUTPATIENT)
Dept: ORTHOPEDIC SURGERY | Facility: CLINIC | Age: 64
End: 2019-11-26

## 2019-11-26 DIAGNOSIS — E83.52 HYPERCALCEMIA: ICD-10-CM

## 2019-11-26 DIAGNOSIS — I10 ESSENTIAL HYPERTENSION: ICD-10-CM

## 2019-11-26 DIAGNOSIS — E21.3 HYPERPARATHYROIDISM: Primary | ICD-10-CM

## 2019-11-26 RX ORDER — LISINOPRIL 10 MG/1
TABLET ORAL
Qty: 30 TABLET | Refills: 0 | Status: SHIPPED | OUTPATIENT
Start: 2019-11-26 | End: 2019-12-16 | Stop reason: SDUPTHER

## 2019-11-26 NOTE — TELEPHONE ENCOUNTER
pt calling requests the [facility] be canceled and referral to [facility] be put in place they can get her In quicker / phone # [phone]

## 2019-12-12 ENCOUNTER — OFFICE VISIT (OUTPATIENT)
Dept: FAMILY MEDICINE CLINIC | Facility: CLINIC | Age: 64
End: 2019-12-12

## 2019-12-12 VITALS
TEMPERATURE: 97.4 F | BODY MASS INDEX: 33.27 KG/M2 | SYSTOLIC BLOOD PRESSURE: 148 MMHG | WEIGHT: 207 LBS | OXYGEN SATURATION: 99 % | HEART RATE: 72 BPM | HEIGHT: 66 IN | DIASTOLIC BLOOD PRESSURE: 84 MMHG

## 2019-12-12 DIAGNOSIS — E21.0 HYPERPARATHYROID BONE DISEASE (HCC): ICD-10-CM

## 2019-12-12 DIAGNOSIS — I15.2 HYPERTENSION DUE TO ENDOCRINE DISORDER: ICD-10-CM

## 2019-12-12 DIAGNOSIS — E53.8 VITAMIN B12 DEFICIENCY: ICD-10-CM

## 2019-12-12 DIAGNOSIS — E55.9 VITAMIN D DEFICIENCY: ICD-10-CM

## 2019-12-12 DIAGNOSIS — R53.83 OTHER FATIGUE: ICD-10-CM

## 2019-12-12 DIAGNOSIS — E78.00 PURE HYPERCHOLESTEROLEMIA: ICD-10-CM

## 2019-12-12 DIAGNOSIS — E11.69 TYPE 2 DIABETES MELLITUS WITH OTHER SPECIFIED COMPLICATION, UNSPECIFIED WHETHER LONG TERM INSULIN USE (HCC): Primary | ICD-10-CM

## 2019-12-12 DIAGNOSIS — I10 ESSENTIAL HYPERTENSION: ICD-10-CM

## 2019-12-12 LAB
25(OH)D3 SERPL-MCNC: 43 NG/ML (ref 30–100)
ALBUMIN SERPL-MCNC: 4.6 G/DL (ref 3.5–5.2)
ALBUMIN/GLOB SERPL: 1.6 G/DL
ALP SERPL-CCNC: 122 U/L (ref 39–117)
ALT SERPL W P-5'-P-CCNC: 49 U/L (ref 1–33)
ANION GAP SERPL CALCULATED.3IONS-SCNC: 8.3 MMOL/L (ref 5–15)
AST SERPL-CCNC: 31 U/L (ref 1–32)
BASOPHILS # BLD AUTO: 0.03 10*3/MM3 (ref 0–0.2)
BASOPHILS NFR BLD AUTO: 0.5 % (ref 0–1.5)
BILIRUB SERPL-MCNC: 0.7 MG/DL (ref 0.2–1.2)
BUN BLD-MCNC: 16 MG/DL (ref 8–23)
BUN/CREAT SERPL: 21.3 (ref 7–25)
CALCIUM SPEC-SCNC: 12 MG/DL (ref 8.6–10.5)
CHLORIDE SERPL-SCNC: 105 MMOL/L (ref 98–107)
CHOLEST SERPL-MCNC: 128 MG/DL (ref 0–200)
CO2 SERPL-SCNC: 26.7 MMOL/L (ref 22–29)
CREAT BLD-MCNC: 0.75 MG/DL (ref 0.57–1)
DEPRECATED RDW RBC AUTO: 38 FL (ref 37–54)
EOSINOPHIL # BLD AUTO: 0.09 10*3/MM3 (ref 0–0.4)
EOSINOPHIL NFR BLD AUTO: 1.6 % (ref 0.3–6.2)
ERYTHROCYTE [DISTWIDTH] IN BLOOD BY AUTOMATED COUNT: 12.6 % (ref 12.3–15.4)
GFR SERPL CREATININE-BSD FRML MDRD: 78 ML/MIN/1.73
GLOBULIN UR ELPH-MCNC: 2.9 GM/DL
GLUCOSE BLD-MCNC: 162 MG/DL (ref 65–99)
GLUCOSE BLDC GLUCOMTR-MCNC: 152 MG/DL (ref 70–130)
HBA1C MFR BLD: 7.6 %
HCT VFR BLD AUTO: 44.5 % (ref 34–46.6)
HDLC SERPL-MCNC: 97 MG/DL (ref 40–60)
HGB BLD-MCNC: 15 G/DL (ref 12–15.9)
IMM GRANULOCYTES # BLD AUTO: 0.01 10*3/MM3 (ref 0–0.05)
IMM GRANULOCYTES NFR BLD AUTO: 0.2 % (ref 0–0.5)
LDLC SERPL CALC-MCNC: 20 MG/DL (ref 0–100)
LDLC/HDLC SERPL: 0.21 {RATIO}
LYMPHOCYTES # BLD AUTO: 2.27 10*3/MM3 (ref 0.7–3.1)
LYMPHOCYTES NFR BLD AUTO: 39.9 % (ref 19.6–45.3)
MCH RBC QN AUTO: 27.9 PG (ref 26.6–33)
MCHC RBC AUTO-ENTMCNC: 33.7 G/DL (ref 31.5–35.7)
MCV RBC AUTO: 82.7 FL (ref 79–97)
MONOCYTES # BLD AUTO: 0.44 10*3/MM3 (ref 0.1–0.9)
MONOCYTES NFR BLD AUTO: 7.7 % (ref 5–12)
NEUTROPHILS # BLD AUTO: 2.85 10*3/MM3 (ref 1.7–7)
NEUTROPHILS NFR BLD AUTO: 50.1 % (ref 42.7–76)
NRBC BLD AUTO-RTO: 0 /100 WBC (ref 0–0.2)
PLATELET # BLD AUTO: 166 10*3/MM3 (ref 140–450)
PMV BLD AUTO: 11.2 FL (ref 6–12)
POTASSIUM BLD-SCNC: 5.1 MMOL/L (ref 3.5–5.2)
PROT SERPL-MCNC: 7.5 G/DL (ref 6–8.5)
RBC # BLD AUTO: 5.38 10*6/MM3 (ref 3.77–5.28)
SODIUM BLD-SCNC: 140 MMOL/L (ref 136–145)
T4 FREE SERPL-MCNC: 1.32 NG/DL (ref 0.93–1.7)
TRIGL SERPL-MCNC: 54 MG/DL (ref 0–150)
TSH SERPL DL<=0.05 MIU/L-ACNC: 2.54 UIU/ML (ref 0.27–4.2)
VIT B12 BLD-MCNC: 583 PG/ML (ref 211–946)
VLDLC SERPL-MCNC: 10.8 MG/DL (ref 5–40)
WBC NRBC COR # BLD: 5.69 10*3/MM3 (ref 3.4–10.8)

## 2019-12-12 PROCEDURE — 82306 VITAMIN D 25 HYDROXY: CPT | Performed by: NURSE PRACTITIONER

## 2019-12-12 PROCEDURE — 80053 COMPREHEN METABOLIC PANEL: CPT | Performed by: NURSE PRACTITIONER

## 2019-12-12 PROCEDURE — 99213 OFFICE O/P EST LOW 20 MIN: CPT | Performed by: NURSE PRACTITIONER

## 2019-12-12 PROCEDURE — 82962 GLUCOSE BLOOD TEST: CPT | Performed by: NURSE PRACTITIONER

## 2019-12-12 PROCEDURE — 84439 ASSAY OF FREE THYROXINE: CPT | Performed by: NURSE PRACTITIONER

## 2019-12-12 PROCEDURE — 82607 VITAMIN B-12: CPT | Performed by: NURSE PRACTITIONER

## 2019-12-12 PROCEDURE — 83036 HEMOGLOBIN GLYCOSYLATED A1C: CPT | Performed by: NURSE PRACTITIONER

## 2019-12-12 PROCEDURE — 80061 LIPID PANEL: CPT | Performed by: NURSE PRACTITIONER

## 2019-12-12 PROCEDURE — 84443 ASSAY THYROID STIM HORMONE: CPT | Performed by: NURSE PRACTITIONER

## 2019-12-12 PROCEDURE — 85025 COMPLETE CBC W/AUTO DIFF WBC: CPT | Performed by: NURSE PRACTITIONER

## 2019-12-12 RX ORDER — PEN NEEDLE, DIABETIC 32GX 5/32"
NEEDLE, DISPOSABLE MISCELLANEOUS
Qty: 100 EACH | Refills: 3 | Status: SHIPPED | OUTPATIENT
Start: 2019-12-12 | End: 2021-02-19

## 2019-12-12 RX ORDER — METFORMIN HYDROCHLORIDE 500 MG/1
500 TABLET, EXTENDED RELEASE ORAL 2 TIMES DAILY
Qty: 180 TABLET | Refills: 0 | Status: SHIPPED | OUTPATIENT
Start: 2019-12-12 | End: 2020-03-12

## 2019-12-12 NOTE — PROGRESS NOTES
Chief Complaint   Patient presents with   • Follow-up     3 month follow upDM        Subjective   Jack Hidalgo is a 64 y.o.  female who presents today for   Pt reports that she is going to see Dr Le to discuss herniation. Did see endocrinology for hyperparathyroidism she is being followed by her. Will send labs for review when received. They believe this is related too elevated calcium levels. Pt reports chronic body aches as endocrinology feels this is related.      Reports taking daily blood sugars and feels these are controlled did not bring list in for review.  Takes metformin daily. Reports is diet complaint. Start documenting daily and bring into office at next visit.         Jack Hidalgo  has a past medical history of B12 deficiency, Biliary dyskinesia (09/2012), Chronic constipation, Colon polyps, Dyslipidemia (6/4/2019), Fatty liver disease, nonalcoholic, Gastroesophageal reflux disease (3/5/2019), GERD (gastroesophageal reflux disease), Hearing loss, bilateral, Hypertension, Hypothyroidism, Morbid obesity (CMS/HCC), Osteopenia, Type 2 diabetes mellitus (CMS/HCC), and Vulvar intraepithelial neoplasia (MARTÍNEZ).    Allergies   Allergen Reactions   • Butorphanol Anaphylaxis   • Cephalexin Hives   • Lorazepam Hives   • Penicillin G Hives       Current Outpatient Medications:   •  amLODIPine (NORVASC) 2.5 MG tablet, TAKE 1 TABLET EVERY DAY, Disp: 30 tablet, Rfl: 3  •  aspirin (ADULT ASPIRIN EC LOW STRENGTH) 81 MG EC tablet, ADULT ASPIRIN EC LOW STRENGTH 81 MG ORAL TABLET DELAYED RELEASE, Disp: , Rfl:   •  bimatoprost (LATISSE) 0.03 % ophthalmic solution, APPLY A LINE TO UPPER LID ONLY, Disp: 3 mL, Rfl: 3  •  bisacodyl (DULCOLAX) 5 MG EC tablet, Take 5 mg by mouth Daily As Needed for Constipation., Disp: , Rfl:   •  glucose blood (FREESTYLE TEST STRIPS) test strip, FREESTYLE TEST STRP, Disp: , Rfl:   •  metFORMIN ER (GLUCOPHAGE-XR) 500 MG 24 hr tablet, Take 1 tablet by mouth 2 (Two) Times a Day for 90  days., Disp: 180 tablet, Rfl: 0  •  VICTOZA 18 MG/3ML solution pen-injector injection, , Disp: , Rfl:   •  amLODIPine-atorvastatin (CADUET) 5-10 MG per tablet, Take 1 tablet by mouth Daily for 90 days., Disp: 90 tablet, Rfl: 0  •  BD PEN NEEDLE JENNIFER U/F 32G X 4 MM misc, Use as directed for insulin injections, Disp: 100 each, Rfl: 3  •  lisinopril (PRINIVIL,ZESTRIL) 10 MG tablet, TAKE ONE (1) TABLET BY MOUTH DAILY, Disp: 30 tablet, Rfl: 0  •  SYNTHROID 112 MCG tablet, TAKE ONE-HALF (1/2) TABLET BY MOUTH IN THE MORNING 30-60 MINUTES PRIOR TO FIRST MEAL OF DAY ON EMPTY STOMACH, Disp: 15 tablet, Rfl: 2  Past Medical History:   Diagnosis Date   • B12 deficiency    • Biliary dyskinesia 09/2012   • Chronic constipation    • Colon polyps    • Dyslipidemia 6/4/2019   • Fatty liver disease, nonalcoholic    • Gastroesophageal reflux disease 3/5/2019   • GERD (gastroesophageal reflux disease)    • Hearing loss, bilateral    • Hypertension    • Hypothyroidism    • Morbid obesity (CMS/HCC)    • Osteopenia    • Type 2 diabetes mellitus (CMS/HCC)    • Vulvar intraepithelial neoplasia (MARTÍNEZ)      Past Surgical History:   Procedure Laterality Date   • BLADDER REPAIR     • BREAST SURGERY      Breast Lift   • CHOLECYSTECTOMY  09/28/2012    Lap   • COLONOSCOPY      with 9 polyps 2 were pre cancerous Dr Guzman   • HERNIA REPAIR  2018    Double Hernias- [facility]   • HYSTERECTOMY     • LIPOMA EXCISION      Lipoma & Sub Cyst   • LIPOSUCTION     • OTHER SURGICAL HISTORY  2011    Vaginal Ablation ([facility])   • OTHER SURGICAL HISTORY Right 2012    Leg Ablation   • VEIN SURGERY      Vein Removal     Social History     Socioeconomic History   • Marital status:      Spouse name: Not on file   • Number of children: Not on file   • Years of education: Not on file   • Highest education level: Not on file   Tobacco Use   • Smoking status: Never Smoker   • Smokeless tobacco: Never Used   • Tobacco comment: Passive Smoke: N    Substance and Sexual Activity   • Alcohol use: No     Frequency: Never   • Drug use: No     Family History   Problem Relation Age of Onset   • Cancer Mother         Colon Cancer   • Diabetes Father         DMII   • Kidney disease Father    • Osteoporosis Father    • Liver disease Sister    • Other Brother         MRSA   • Cancer Brother         Facial and Neck Cancer   • Rheumatic fever Other    • Hypertension Other      PHQ-2 Depression Screening  Little interest or pleasure in doing things?     Feeling down, depressed, or hopeless?     PHQ-2 Total Score       PHQ-9 Depression Screening  Little interest or pleasure in doing things?     Feeling down, depressed, or hopeless?     Trouble falling or staying asleep, or sleeping too much?     Feeling tired or having little energy?     Poor appetite or overeating?     Feeling bad about yourself - or that you are a failure or have let yourself or your family down?     Trouble concentrating on things, such as reading the newspaper or watching television?     Moving or speaking so slowly that other people could have noticed? Or the opposite - being so fidgety or restless that you have been moving around a lot more than usual?     Thoughts that you would be better off dead, or of hurting yourself in some way?     PHQ-9 Total Score     If you checked off any problems, how difficult have these problems made it for you to do your work, take care of things at home, or get along with other people?         Family history, surgical history, past medical history, Allergies and med's reviewed with patient today and updated in [employer]MR.     ROS:  Review of Systems   Constitutional: Negative for activity change, appetite change and fatigue.   Respiratory: Negative for cough and shortness of breath.    Cardiovascular: Negative for chest pain and leg swelling.   Gastrointestinal: Negative for abdominal pain and nausea.   Endocrine: Negative for polydipsia, polyphagia and polyuria.  "  Musculoskeletal: Positive for arthralgias and myalgias. Negative for back pain.   Skin: Negative for rash.   Neurological: Negative for speech difficulty and headaches.   Psychiatric/Behavioral: Negative for confusion and decreased concentration.   All other systems reviewed and are negative.      OBJECTIVE:  Vitals:    12/12/19 1022   BP: 148/84   Pulse: 72   Temp: 97.4 °F (36.3 °C)   TempSrc: Oral   SpO2: 99%   Weight: 93.9 kg (207 lb)   Height: 166.4 cm (65.5\")     Body mass index is 33.92 kg/m².  Physical Exam   Constitutional: She is oriented to person, place, and time. She appears well-developed and well-nourished.   HENT:   Head: Normocephalic and atraumatic.   Eyes: Pupils are equal, round, and reactive to light. Conjunctivae and EOM are normal.   Neck: Normal range of motion. Neck supple.   Cardiovascular: Normal rate, regular rhythm and normal heart sounds.   Pulmonary/Chest: Effort normal and breath sounds normal.   Abdominal: Soft. Bowel sounds are normal.   Musculoskeletal: Normal range of motion.   Neurological: She is alert and oriented to person, place, and time.   Skin: Skin is warm and dry.   Psychiatric: She has a normal mood and affect. Her behavior is normal. Judgment and thought content normal.   Nursing note and vitals reviewed.      ASSESSMENT/ PLAN:    Jack was seen today for follow-up.    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[facility])    Hypertension due to endocrine disorder    Essential hypertension    Vitamin D deficiency  -     Vitamin D 25 Hydroxy; Future  -     Vitamin D 25 Hydroxy    Vitamin B12 deficiency  -     Vitamin B12; Future  -     Vitamin B12    Other fatigue  -     TSH; Future  -     T4, Free; Future  -     CBC & Differential; Future  -     T4, Free  -     " TSH  -     CBC & Differential  -     CBC Auto Differential    Pure hypercholesterolemia  -     Comprehensive Metabolic Panel; Future  -     Lipid Panel; Future  -     Lipid Panel  -     Comprehensive Metabolic Panel    Other orders  -     metFORMIN ER (GLUCOPHAGE-XR) 500 MG 24 hr tablet; Take 1 tablet by mouth 2 (Two) Times a Day for 90 days.        Orders Placed Today:     New Medications Ordered This Visit   Medications   • metFORMIN ER (GLUCOPHAGE-XR) 500 MG 24 hr tablet     Sig: Take 1 tablet by mouth 2 (Two) Times a Day for 90 days.     Dispense:  180 tablet     Refill:  0        Management Plan:   F/U with endocrinology due to elevated calcium  HLD: Labs today  HTN: Stable blood pressures reviewed      Refills sent today      An After Visit Summary was printed and given to the patient at discharge.    Follow-up: Return in about 3 months (around 3/12/2020).    JW Ellison 1/21/2020 9:33 AM  This note was electronically signed.

## 2019-12-16 ENCOUNTER — TELEPHONE (OUTPATIENT)
Dept: FAMILY MEDICINE CLINIC | Facility: CLINIC | Age: 64
End: 2019-12-16

## 2019-12-16 DIAGNOSIS — E78.2 MIXED HYPERLIPIDEMIA: Primary | ICD-10-CM

## 2019-12-16 DIAGNOSIS — I10 ESSENTIAL HYPERTENSION: ICD-10-CM

## 2019-12-16 RX ORDER — AMLODIPINE BESYLATE AND ATORVASTATIN CALCIUM 5; 10 MG/1; MG/1
1 TABLET, FILM COATED ORAL DAILY
Qty: 90 TABLET | Refills: 0 | Status: SHIPPED | OUTPATIENT
Start: 2019-12-16 | End: 2020-03-12 | Stop reason: SDUPTHER

## 2019-12-16 RX ORDER — LISINOPRIL 10 MG/1
TABLET ORAL
Qty: 30 TABLET | Refills: 0 | Status: SHIPPED | OUTPATIENT
Start: 2019-12-16 | End: 2020-01-20

## 2020-01-13 ENCOUNTER — TELEPHONE (OUTPATIENT)
Dept: ENDOCRINOLOGY | Facility: CLINIC | Age: 65
End: 2020-01-13

## 2020-01-20 DIAGNOSIS — I10 ESSENTIAL HYPERTENSION: ICD-10-CM

## 2020-01-20 RX ORDER — LEVOTHYROXINE SODIUM 112 MCG
TABLET ORAL
Qty: 15 TABLET | Refills: 2 | Status: SHIPPED | OUTPATIENT
Start: 2020-01-20 | End: 2020-04-15

## 2020-01-20 RX ORDER — LISINOPRIL 10 MG/1
TABLET ORAL
Qty: 30 TABLET | Refills: 0 | Status: SHIPPED | OUTPATIENT
Start: 2020-01-20 | End: 2020-02-20

## 2020-02-10 RX ORDER — LIRAGLUTIDE 6 MG/ML
INJECTION SUBCUTANEOUS
Qty: 9 PEN | Refills: 2 | Status: SHIPPED | OUTPATIENT
Start: 2020-02-10 | End: 2020-05-08

## 2020-02-20 DIAGNOSIS — I10 ESSENTIAL HYPERTENSION: ICD-10-CM

## 2020-02-20 RX ORDER — LISINOPRIL 10 MG/1
TABLET ORAL
Qty: 90 TABLET | Refills: 0 | Status: SHIPPED | OUTPATIENT
Start: 2020-02-20 | End: 2020-05-07

## 2020-03-09 ENCOUNTER — DOCUMENTATION (OUTPATIENT)
Dept: FAMILY MEDICINE CLINIC | Facility: CLINIC | Age: 65
End: 2020-03-09

## 2020-03-12 ENCOUNTER — OFFICE VISIT (OUTPATIENT)
Dept: FAMILY MEDICINE CLINIC | Facility: CLINIC | Age: 65
End: 2020-03-12

## 2020-03-12 VITALS
TEMPERATURE: 97.9 F | SYSTOLIC BLOOD PRESSURE: 136 MMHG | WEIGHT: 208 LBS | OXYGEN SATURATION: 98 % | HEART RATE: 73 BPM | DIASTOLIC BLOOD PRESSURE: 86 MMHG | BODY MASS INDEX: 34.66 KG/M2 | HEIGHT: 65 IN

## 2020-03-12 DIAGNOSIS — E11.69 TYPE 2 DIABETES MELLITUS WITH OTHER SPECIFIED COMPLICATION, UNSPECIFIED WHETHER LONG TERM INSULIN USE (HCC): Primary | ICD-10-CM

## 2020-03-12 DIAGNOSIS — I10 ESSENTIAL HYPERTENSION: ICD-10-CM

## 2020-03-12 LAB
GLUCOSE BLDC GLUCOMTR-MCNC: 143 MG/DL (ref 70–130)
HBA1C MFR BLD: 8.2 %

## 2020-03-12 PROCEDURE — 82962 GLUCOSE BLOOD TEST: CPT | Performed by: NURSE PRACTITIONER

## 2020-03-12 PROCEDURE — 99213 OFFICE O/P EST LOW 20 MIN: CPT | Performed by: NURSE PRACTITIONER

## 2020-03-12 PROCEDURE — 83036 HEMOGLOBIN GLYCOSYLATED A1C: CPT | Performed by: NURSE PRACTITIONER

## 2020-03-12 RX ORDER — METFORMIN HYDROCHLORIDE 500 MG/1
TABLET, EXTENDED RELEASE ORAL
Qty: 180 TABLET | Refills: 0 | Status: SHIPPED | OUTPATIENT
Start: 2020-03-12 | End: 2020-06-08

## 2020-03-12 RX ORDER — AMLODIPINE BESYLATE AND ATORVASTATIN CALCIUM 5; 10 MG/1; MG/1
TABLET, FILM COATED ORAL
Qty: 90 TABLET | Refills: 0 | Status: SHIPPED | OUTPATIENT
Start: 2020-03-12 | End: 2020-06-08

## 2020-03-12 RX ORDER — AMLODIPINE BESYLATE AND ATORVASTATIN CALCIUM 5; 10 MG/1; MG/1
1 TABLET, FILM COATED ORAL DAILY
COMMUNITY
End: 2020-03-12

## 2020-03-12 RX ORDER — MELATONIN
2000 DAILY
COMMUNITY

## 2020-03-12 NOTE — PROGRESS NOTES
Chief Complaint   Patient presents with   • Follow-up     3 month follow up        Subjective   Jack Hidalgo is a 64 y.o.  female who presents today for   Hypertension   This is a chronic problem. The current episode started more than 1 year ago. The problem is uncontrolled. Past treatments include calcium channel blockers. The current treatment provides moderate improvement. There are no compliance problems.    Diabetes   She presents for her follow-up diabetic visit. She has type 2 diabetes mellitus. No MedicAlert identification noted. Her disease course has been fluctuating. There are no diabetic associated symptoms. There are no hypoglycemic complications. Symptoms are improving. There are no diabetic complications. There are no known risk factors for coronary artery disease. When asked about current treatments, none were reported. She is compliant with treatment all of the time. She is following a diabetic diet. She has not had a previous visit with a dietitian. She does not see a podiatrist.Eye exam is not current.     Jack Hidalgo  has a past medical history of B12 deficiency, Biliary dyskinesia (09/2012), Chronic constipation, Colon polyps, Dyslipidemia (6/4/2019), Fatty liver disease, nonalcoholic, Gastroesophageal reflux disease (3/5/2019), GERD (gastroesophageal reflux disease), Hearing loss, bilateral, Hypertension, Hypothyroidism, Morbid obesity (CMS/HCC), Osteopenia, Type 2 diabetes mellitus (CMS/HCC), and Vulvar intraepithelial neoplasia (MARTÍNEZ).    Allergies   Allergen Reactions   • Butorphanol Anaphylaxis   • Cephalexin Hives   • Lorazepam Hives   • Penicillin G Hives       Current Outpatient Medications:   •  Acetaminophen 325 MG capsule, Take  by mouth., Disp: , Rfl:   •  aspirin (ADULT ASPIRIN EC LOW STRENGTH) 81 MG EC tablet, ADULT ASPIRIN EC LOW STRENGTH 81 MG ORAL TABLET DELAYED RELEASE, Disp: , Rfl:   •  BD PEN NEEDLE JENNIFER U/F 32G X 4 MM misc, Use as directed for insulin injections,  Disp: 100 each, Rfl: 3  •  bimatoprost (LATISSE) 0.03 % ophthalmic solution, APPLY A LINE TO UPPER LID ONLY, Disp: 3 mL, Rfl: 3  •  bisacodyl (DULCOLAX) 5 MG EC tablet, Take 5 mg by mouth Daily As Needed for Constipation., Disp: , Rfl:   •  cholecalciferol (VITAMIN D3) 25 MCG (1000 UT) tablet, Take 2,000 Units by mouth Daily., Disp: , Rfl:   •  glucose blood (FREESTYLE TEST STRIPS) test strip, FREESTYLE TEST STRP, Disp: , Rfl:   •  lisinopril (PRINIVIL,ZESTRIL) 10 MG tablet, TAKE ONE (1) TABLET BY MOUTH DAILY, Disp: 90 tablet, Rfl: 0  •  SYNTHROID 112 MCG tablet, TAKE ONE-HALF (1/2) TABLET BY MOUTH IN THE MORNING 30-60 MINUTES PRIOR TO FIRST MEAL OF DAY ON EMPTY STOMACH, Disp: 15 tablet, Rfl: 2  •  VICTOZA 18 MG/3ML solution pen-injector injection, INJECT 1.8 MG SUBCUTANEOUSLY DAILY AS DIRECTED, Disp: 9 pen, Rfl: 2  •  amLODIPine-atorvastatin (CADUET) 5-10 MG per tablet, TAKE 1 TABLET DAILY, Disp: 90 tablet, Rfl: 0  •  metFORMIN ER (GLUCOPHAGE-XR) 500 MG 24 hr tablet, TAKE 1 TABLET BY MOUTH 2 (TWO) TIMES A DAY., Disp: 180 tablet, Rfl: 0  Past Medical History:   Diagnosis Date   • B12 deficiency    • Biliary dyskinesia 09/2012   • Chronic constipation    • Colon polyps    • Dyslipidemia 6/4/2019   • Fatty liver disease, nonalcoholic    • Gastroesophageal reflux disease 3/5/2019   • GERD (gastroesophageal reflux disease)    • Hearing loss, bilateral    • Hypertension    • Hypothyroidism    • Morbid obesity (CMS/HCC)    • Osteopenia    • Type 2 diabetes mellitus (CMS/HCC)    • Vulvar intraepithelial neoplasia (MARTÍNEZ)      Past Surgical History:   Procedure Laterality Date   • BLADDER REPAIR     • BREAST SURGERY      Breast Lift   • CHOLECYSTECTOMY  09/28/2012    Lap   • COLONOSCOPY      with 9 polyps 2 were pre cancerous Dr Guzman   • HERNIA REPAIR  2018    Double Hernias- [facility]   • HYSTERECTOMY     • LIPOMA EXCISION      Lipoma & Sub Cyst   • LIPOSUCTION     • OTHER SURGICAL HISTORY  2011    [facility]inal [facility]blation ([facility])   • OTHER SURGICAL HISTORY Right 2012    Leg Ablation   • THYROID SURGERY     • VEIN SURGERY      Vein Removal     Social History     Socioeconomic History   • Marital status:      Spouse name: Not on file   • Number of children: Not on file   • Years of education: Not on file   • Highest education level: Not on file   Tobacco Use   • Smoking status: Never Smoker   • Smokeless tobacco: Never Used   • Tobacco comment: Passive Smoke: N   Substance and Sexual Activity   • Alcohol use: No     Frequency: Never   • Drug use: No     Family History   Problem Relation Age of Onset   • Cancer Mother         Colon Cancer   • Diabetes Father         DMII   • Kidney disease Father    • Osteoporosis Father    • Liver disease Sister    • Other Brother         MRSA   • Cancer Brother         Facial and Neck Cancer   • Rheumatic fever Other    • Hypertension Other      PHQ-2 Depression Screening  Little interest or pleasure in doing things?     Feeling down, depressed, or hopeless?     PHQ-2 Total Score       PHQ-9 Depression Screening  Little interest or pleasure in doing things?     Feeling down, depressed, or hopeless?     Trouble falling or staying asleep, or sleeping too much?     Feeling tired or having little energy?     Poor appetite or overeating?     Feeling bad about yourself - or that you are a failure or have let yourself or your family down?     Trouble concentrating on things, such as reading the newspaper or watching television?     Moving or speaking so slowly that other people could have noticed? Or the opposite - being so fidgety or restless that you have been moving around a lot more than usual?     Thoughts that you would be better off dead, or of hurting yourself in some way?     PHQ-9 Total Score     If you checked off any problems, how difficult have these problems made it for you to do your work, take care of things at home, or get along with other people?         Family history,  "surgical history, past medical history, Allergies and med's reviewed with patient today and updated in [facility] EMR.     ROS:  Review of Systems    OBJECTIVE:  Vitals:    03/12/20 1103   BP: 136/86   Pulse: 73   Temp: 97.9 °F (36.6 °C)   TempSrc: Oral   SpO2: 98%   Weight: 94.3 kg (208 lb)   Height: 165.1 cm (65\")     Body mass index is 34.61 kg/m².  Physical Exam   Constitutional: She is oriented to person, place, and time. She appears well-developed and well-nourished.   HENT:   Head: Normocephalic and atraumatic.   Eyes: Pupils are equal, round, and reactive to light. Conjunctivae and EOM are normal.   Neck: Normal range of motion. Neck supple.   Cardiovascular: Normal rate, regular rhythm and normal heart sounds.   Pulmonary/Chest: Effort normal and breath sounds normal.   Abdominal: Soft. Bowel sounds are normal.   Musculoskeletal: Normal range of motion.   Neurological: She is alert and oriented to person, place, and time.   Skin: Skin is warm and dry.   Psychiatric: She has a normal mood and affect. Her behavior is normal. Judgment and thought content normal.   Nursing note and vitals reviewed.      ASSESSMENT/ PLAN:    Jack was seen today for follow-up.    Diagnoses and all orders for this visit:    Type 2 diabetes mellitus with other specified complication, unspecified whether long term insulin use (CMS/[facility], [facility])  -     POC Glucose  -     POC Glycosylated Hemoglobin (Hb A1C)        Orders Placed Today:     No orders of the defined types were placed in this encounter.       Management Plan:     An After Visit Summary was printed and given to the patient at discharge.    Follow-up: Return in about 3 months (around 6/12/2020).    Kayla Mendoza, APRN 3/29/2020 19:59  This note was electronically signed.      "

## 2020-04-15 RX ORDER — LEVOTHYROXINE SODIUM 112 MCG
TABLET ORAL
Qty: 15 TABLET | Refills: 2 | Status: SHIPPED | OUTPATIENT
Start: 2020-04-15 | End: 2020-07-13

## 2020-05-07 DIAGNOSIS — I10 ESSENTIAL HYPERTENSION: ICD-10-CM

## 2020-05-07 RX ORDER — LISINOPRIL 10 MG/1
TABLET ORAL
Qty: 90 TABLET | Refills: 0 | Status: SHIPPED | OUTPATIENT
Start: 2020-05-07 | End: 2020-08-13 | Stop reason: SDUPTHER

## 2020-05-08 RX ORDER — LIRAGLUTIDE 6 MG/ML
INJECTION SUBCUTANEOUS
Qty: 3 ML | Refills: 7 | Status: SHIPPED | OUTPATIENT
Start: 2020-05-08 | End: 2020-08-31 | Stop reason: SDUPTHER

## 2020-06-08 RX ORDER — AMLODIPINE BESYLATE AND ATORVASTATIN CALCIUM 5; 10 MG/1; MG/1
1 TABLET, FILM COATED ORAL DAILY
Qty: 90 TABLET | Refills: 0 | Status: SHIPPED | OUTPATIENT
Start: 2020-06-08 | End: 2020-08-31 | Stop reason: SDUPTHER

## 2020-06-08 NOTE — TELEPHONE ENCOUNTER
Date of last refill:    Caduet: 3-  Metformin: 3-    Is there an Inspect on file:    Last appt date: 3-     Next appt date: NONE       Additional information:

## 2020-07-13 RX ORDER — LEVOTHYROXINE SODIUM 112 MCG
TABLET ORAL
Qty: 15 TABLET | Refills: 2 | Status: SHIPPED | OUTPATIENT
Start: 2020-07-13 | End: 2020-10-13

## 2020-07-13 NOTE — TELEPHONE ENCOUNTER
Date of last refill: 4-    Is there an Inspect on file: n/a    Last appt date: 3-      Next appt date: 7-      Additional information:

## 2020-07-20 ENCOUNTER — TELEMEDICINE (OUTPATIENT)
Dept: FAMILY MEDICINE CLINIC | Facility: CLINIC | Age: 65
End: 2020-07-20

## 2020-07-20 VITALS
BODY MASS INDEX: 33.95 KG/M2 | TEMPERATURE: 97.8 F | DIASTOLIC BLOOD PRESSURE: 66 MMHG | SYSTOLIC BLOOD PRESSURE: 114 MMHG | HEART RATE: 72 BPM | WEIGHT: 204 LBS

## 2020-07-20 DIAGNOSIS — E11.69 TYPE 2 DIABETES MELLITUS WITH OTHER SPECIFIED COMPLICATION, UNSPECIFIED WHETHER LONG TERM INSULIN USE (HCC): ICD-10-CM

## 2020-07-20 DIAGNOSIS — E55.9 VITAMIN D DEFICIENCY: ICD-10-CM

## 2020-07-20 DIAGNOSIS — R53.83 OTHER FATIGUE: ICD-10-CM

## 2020-07-20 DIAGNOSIS — E53.8 VITAMIN B12 DEFICIENCY: ICD-10-CM

## 2020-07-20 DIAGNOSIS — I10 ESSENTIAL HYPERTENSION: ICD-10-CM

## 2020-07-20 DIAGNOSIS — R73.9 HYPERGLYCEMIA: ICD-10-CM

## 2020-07-20 DIAGNOSIS — E78.2 MIXED HYPERLIPIDEMIA: Primary | ICD-10-CM

## 2020-07-20 PROCEDURE — 99214 OFFICE O/P EST MOD 30 MIN: CPT | Performed by: NURSE PRACTITIONER

## 2020-07-27 RX ORDER — BIMATOPROST 3 UG/ML
SOLUTION TOPICAL
Qty: 1 EACH | Refills: 1 | Status: SHIPPED | OUTPATIENT
Start: 2020-07-27 | End: 2021-02-19

## 2020-07-27 NOTE — TELEPHONE ENCOUNTER
Date of last refill:07/16/2019    Is there an Inspect on file: N /A    Last appt date: 07/20/2020     Next appt date: N/A      Additional information:

## 2020-08-12 NOTE — PROGRESS NOTES
Chief Complaint   Patient presents with   • Hypertension     Checks BP at home occasionally.    • Diabetes     Tests blood glucose once weekly. Lowest = 125; Highest = 180.   • Hypothyroidism   • Hyperlipidemia     Continues on Caduet without s/e.    • Labs Only     Wanting lab orders         Subjective   Jack Hidalgo is a 64 y.o.  female who presents today for   • Hypertension doing well with my symptoms  • Diabetes    Tests blood glucose once weekly. Lowest = 125; Highest = 180.  • Hypothyroidism: stable   • Hyperlipidemia: Labs Only    Wanting lab orders sent to [facility]  She has had no complaints feels she is doing well       Jack Hidalgo  has a past medical history of B12 deficiency, Biliary dyskinesia (09/2012), Chronic constipation, Colon polyps, Dyslipidemia (6/4/2019), Fatty liver disease, nonalcoholic, Gastroesophageal reflux disease (3/5/2019), GERD (gastroesophageal reflux disease), Hearing loss, bilateral, Hypertension, Hypothyroidism, Morbid obesity (CMS/HCC), Osteopenia, Type 2 diabetes mellitus (CMS/HCC), and Vulvar intraepithelial neoplasia (MARTÍNEZ).    Allergies   Allergen Reactions   • Butorphanol Anaphylaxis   • Cephalexin Hives   • Lorazepam Hives   • Penicillin G Hives       Current Outpatient Medications:   •  Acetaminophen 325 MG capsule, Take 1 tablet by mouth Daily As Needed., Disp: , Rfl:   •  amLODIPine-atorvastatin (CADUET) 5-10 MG per tablet, Take 1 tablet by mouth Daily. Please schedule follow up with provider. Thank you. (Patient taking differently: Take 1 tablet by mouth Daily.), Disp: 90 tablet, Rfl: 0  •  aspirin (ADULT ASPIRIN EC LOW STRENGTH) 81 MG EC tablet, Take 81 mg by mouth Daily., Disp: , Rfl:   •  BD PEN NEEDLE JENNIFER U/F 32G X 4 MM misc, Use as directed for insulin injections, Disp: 100 each, Rfl: 3  •  bisacodyl (DULCOLAX) 5 MG EC tablet, Take 5 mg by mouth Daily As Needed for Constipation., Disp: , Rfl:   •  Calcium Carbonate (CALCIUM 500 PO), Take 2 tablets by mouth  Daily., Disp: , Rfl:   •  cholecalciferol (VITAMIN D3) 25 MCG (1000 UT) tablet, Take 2,000 Units by mouth Daily., Disp: , Rfl:   •  glucose blood (FREESTYLE TEST STRIPS) test strip, 1 each by Other route Daily., Disp: , Rfl:   •  lisinopril (PRINIVIL,ZESTRIL) 10 MG tablet, TAKE ONE (1) TABLET BY MOUTH DAILY, Disp: 90 tablet, Rfl: 0  •  metFORMIN (GLUCOPHAGE) 500 MG tablet, Take 1 tablet by mouth 2 (Two) Times a Day. Please call to schedule appt with provider. Thank you. (Patient taking differently: Take 500 mg by mouth 2 (Two) Times a Day.), Disp: 180 tablet, Rfl: 0  •  SYNTHROID 112 MCG tablet, TAKE ONE-HALF (1/2) TABLET BY MOUTH IN THE MORNING 30-60 MINUTES PRIOR TO FIRST MEAL OF DAY ON EMPTY STOMACH, Disp: 15 tablet, Rfl: 2  •  VICTOZA 18 MG/3ML solution pen-injector injection, INJECT 1.8 MG SUBCUTANEOUSLY DAILY AS DIRECTED, Disp: 3 mL, Rfl: 7  •  bimatoprost (LATISSE) 0.03 % ophthalmic solution, Place 1 drop on applicator and apply along skin of upper eyelid at base of eyelashes daily at bedtime; rpt for 2nd eye with clean applicator, Disp: 1 each, Rfl: 1  Past Medical History:   Diagnosis Date   • B12 deficiency    • Biliary dyskinesia 09/2012   • Chronic constipation    • Colon polyps    • Dyslipidemia 6/4/2019   • Fatty liver disease, nonalcoholic    • Gastroesophageal reflux disease 3/5/2019   • GERD (gastroesophageal reflux disease)    • Hearing loss, bilateral    • Hypertension    • Hypothyroidism    • Morbid obesity (CMS/HCC)    • Osteopenia    • Type 2 diabetes mellitus (CMS/HCC)    • Vulvar intraepithelial neoplasia (MARTÍNEZ)      Past Surgical History:   Procedure Laterality Date   • BLADDER REPAIR     • BREAST SURGERY      Breast Lift   • CHOLECYSTECTOMY  09/28/2012    Lap   • COLONOSCOPY      with 9 polyps 2 were pre cancerous Dr Guzman   • HERNIA REPAIR  2018    Double Hernias- [facility]   • HYSTERECTOMY     • LIPOMA EXCISION      Lipoma & Sub Cyst   • LIPOSUCTION     • OTHER SURGICAL HISTORY  2011    Vaginal  Ablation ([facility])   • OTHER SURGICAL HISTORY Right 2012    Leg Ablation   • THYROID SURGERY     • VEIN SURGERY      Vein Removal     Social History     Socioeconomic History   • Marital status:      Spouse name: Not on file   • Number of children: Not on file   • Years of education: Not on file   • Highest education level: Not on file   Tobacco Use   • Smoking status: Never Smoker   • Smokeless tobacco: Never Used   • Tobacco comment: Passive Smoke: N   Substance and Sexual Activity   • Alcohol use: No     Frequency: Never   • Drug use: No     Family History   Problem Relation Age of Onset   • Cancer Mother         Colon Cancer   • Diabetes Father         DMII   • Kidney disease Father    • Osteoporosis Father    • Liver disease Sister    • Other Brother         MRSA   • Cancer Brother         Facial and Neck Cancer   • Rheumatic fever Other    • Hypertension Other      PHQ-2 Depression Screening  Little interest or pleasure in doing things?     Feeling down, depressed, or hopeless?     PHQ-2 Total Score       PHQ-9 Depression Screening  Little interest or pleasure in doing things?     Feeling down, depressed, or hopeless?     Trouble falling or staying asleep, or sleeping too much?     Feeling tired or having little energy?     Poor appetite or overeating?     Feeling bad about yourself - or that you are a failure or have let yourself or your family down?     Trouble concentrating on things, such as reading the newspaper or watching television?     Moving or speaking so slowly that other people could have noticed? Or the opposite - being so fidgety or restless that you have been moving around a lot more than usual?     Thoughts that you would be better off dead, or of hurting yourself in some way?     PHQ-9 Total Score     If you checked off any problems, how difficult have these problems made it for you to do your work, take care of things at home, or get along with other people?          Family history, surgical history, past medical history, Allergies and med's reviewed with patient today and updated in [facility] EMR.     ROS:  Review of Systems   Constitutional: Negative for activity change, appetite change and fatigue.   Respiratory: Negative for cough and shortness of breath.    Cardiovascular: Negative for chest pain and leg swelling.   Gastrointestinal: Negative for abdominal pain and nausea.   Endocrine: Negative for polydipsia, polyphagia and polyuria.   Musculoskeletal: Negative for arthralgias, back pain and myalgias.   Skin: Negative for rash.   Neurological: Negative for speech difficulty and headaches.   Psychiatric/Behavioral: Negative for confusion and decreased concentration.   All other systems reviewed and are negative.      OBJECTIVE:  Vitals:    07/20/20 1543   BP: 114/66   BP Location: Left arm   Pulse: 72   Temp: 97.8 °F (36.6 °C)   TempSrc: Oral   Weight: 92.5 kg (204 lb)     Body mass index is 33.95 kg/m².  Physical Exam   Constitutional: She is oriented to person, place, and time. She appears well-developed and well-nourished.   HENT:   Head: Normocephalic and atraumatic.   Eyes: Pupils are equal, round, and reactive to light. Conjunctivae and EOM are normal.   Neck: Normal range of motion. Neck supple.   Cardiovascular: Normal rate, regular rhythm and normal heart sounds.   Pulmonary/Chest: Effort normal and breath sounds normal.   Abdominal: Soft. Bowel sounds are normal.   Musculoskeletal: Normal range of motion.   Neurological: She is alert and oriented to person, place, and time.   Skin: Skin is warm and dry.   Psychiatric: She has a normal mood and affect. Her behavior is normal. Judgment and thought content normal.   Nursing note and vitals reviewed.      ASSESSMENT/ PLAN:    Jack was seen today for hypertension, diabetes, hypothyroidism, hyperlipidemia and labs only.    Diagnoses and all orders for this visit:    Mixed hyperlipidemia  -     Cancel: Comprehensive  Metabolic Panel; Future  -     Cancel: Lipid Panel; Future  -     Lipid Panel; Future  -     Comprehensive Metabolic Panel; Future  -     Comprehensive Metabolic Panel  -     Lipid Panel    Essential hypertension    Type 2 diabetes mellitus with other specified complication, unspecified whether long term insulin use (CMS/[facility])    Vitamin D deficiency  -     Cancel: Vitamin D 25 Hydroxy; Future  -     Vitamin D 25 Hydroxy; Future  -     Vitamin D 25 Hydroxy    Vitamin B12 deficiency  -     Cancel: Vitamin B12; Future  -     Vitamin B12; Future  -     Vitamin B12    Other fatigue  -     Cancel: TSH; Future  -     Cancel: T4, Free; Future  -     Cancel: CBC & Differential; Future  -     CBC & Differential; Future  -     CBC & Differential  -     T4, Free; Future  -     TSH; Future  -     TSH  -     T4, Free    Hyperglycemia  -     Cancel: Hemoglobin A1c; Future  -     Hemoglobin A1c; Future  -     Hemoglobin A1c        Orders Placed Today:     No orders of the defined types were placed in this encounter.       Management Plan:   Labs today ordered FAX to [facility]  Scripts sent as needed    An After Visit Summary was printed and given to the patient at discharge.    Follow-up: No follow-ups on file.    Kayla Mendoza, APRN 8/12/2020 11:17  This note was electronically signed.

## 2020-08-13 DIAGNOSIS — I10 ESSENTIAL HYPERTENSION: ICD-10-CM

## 2020-08-13 NOTE — TELEPHONE ENCOUNTER
Date of last refill: 05/07/2020    Is there an Inspect on file: N/A     Last appt date: 07/20/2020     Next appt date: N/A      Additional information:

## 2020-08-14 RX ORDER — LISINOPRIL 10 MG/1
10 TABLET ORAL DAILY
Qty: 90 TABLET | Refills: 0 | Status: SHIPPED | OUTPATIENT
Start: 2020-08-14 | End: 2020-10-23

## 2020-08-31 RX ORDER — AMLODIPINE BESYLATE AND ATORVASTATIN CALCIUM 5; 10 MG/1; MG/1
1 TABLET, FILM COATED ORAL DAILY
Qty: 90 TABLET | Refills: 1 | Status: SHIPPED | OUTPATIENT
Start: 2020-08-31 | End: 2021-01-04 | Stop reason: CLARIF

## 2020-08-31 NOTE — TELEPHONE ENCOUNTER
Date of last refill:06/08/2020    Is there an Inspect on file:N/A    Last appt date:03/12/2020     Next appt date:N/A      Additional information:

## 2020-08-31 NOTE — TELEPHONE ENCOUNTER
Date of last refill:08/02/2020    Is there an Inspect on file:N/A    Last appt date:03/12/2020     Next appt date:N/A      Additional information:

## 2020-08-31 NOTE — TELEPHONE ENCOUNTER
Date of last refill:06/08/2020    Is there an Inspect on file:    Last appt date:07/13/2020     Next appt date:N/A      Additional information:

## 2020-09-18 ENCOUNTER — TELEPHONE (OUTPATIENT)
Dept: FAMILY MEDICINE CLINIC | Facility: CLINIC | Age: 65
End: 2020-09-18

## 2020-09-18 DIAGNOSIS — D69.6 DECREASED PLATELET COUNT (HCC): Primary | ICD-10-CM

## 2020-09-21 ENCOUNTER — LAB (OUTPATIENT)
Dept: FAMILY MEDICINE CLINIC | Facility: CLINIC | Age: 65
End: 2020-09-21

## 2020-09-21 DIAGNOSIS — R53.83 FATIGUE, UNSPECIFIED TYPE: ICD-10-CM

## 2020-09-21 DIAGNOSIS — D69.6 DECREASED PLATELET COUNT (HCC): ICD-10-CM

## 2020-09-21 LAB
BASOPHILS # BLD AUTO: 0.03 10*3/MM3 (ref 0–0.2)
BASOPHILS NFR BLD AUTO: 0.5 % (ref 0–1.5)
DEPRECATED RDW RBC AUTO: 40.2 FL (ref 37–54)
EOSINOPHIL # BLD AUTO: 0.21 10*3/MM3 (ref 0–0.4)
EOSINOPHIL NFR BLD AUTO: 3.3 % (ref 0.3–6.2)
ERYTHROCYTE [DISTWIDTH] IN BLOOD BY AUTOMATED COUNT: 13 % (ref 12.3–15.4)
HCT VFR BLD AUTO: 42.5 % (ref 34–46.6)
HGB BLD-MCNC: 14.1 G/DL (ref 12–15.9)
IMM GRANULOCYTES # BLD AUTO: 0.01 10*3/MM3 (ref 0–0.05)
IMM GRANULOCYTES NFR BLD AUTO: 0.2 % (ref 0–0.5)
LYMPHOCYTES # BLD AUTO: 2.4 10*3/MM3 (ref 0.7–3.1)
LYMPHOCYTES NFR BLD AUTO: 38.2 % (ref 19.6–45.3)
MCH RBC QN AUTO: 28.3 PG (ref 26.6–33)
MCHC RBC AUTO-ENTMCNC: 33.2 G/DL (ref 31.5–35.7)
MCV RBC AUTO: 85.3 FL (ref 79–97)
MONOCYTES # BLD AUTO: 0.49 10*3/MM3 (ref 0.1–0.9)
MONOCYTES NFR BLD AUTO: 7.8 % (ref 5–12)
NEUTROPHILS NFR BLD AUTO: 3.15 10*3/MM3 (ref 1.7–7)
NEUTROPHILS NFR BLD AUTO: 50 % (ref 42.7–76)
NRBC BLD AUTO-RTO: 0 /100 WBC (ref 0–0.2)
PLATELET # BLD AUTO: 172 10*3/MM3 (ref 140–450)
PMV BLD AUTO: 11.1 FL (ref 6–12)
RBC # BLD AUTO: 4.98 10*6/MM3 (ref 3.77–5.28)
WBC # BLD AUTO: 6.29 10*3/MM3 (ref 3.4–10.8)

## 2020-09-21 PROCEDURE — 85025 COMPLETE CBC W/AUTO DIFF WBC: CPT | Performed by: NURSE PRACTITIONER

## 2020-09-21 PROCEDURE — 36415 COLL VENOUS BLD VENIPUNCTURE: CPT | Performed by: NURSE PRACTITIONER

## 2020-10-05 ENCOUNTER — TELEPHONE (OUTPATIENT)
Dept: FAMILY MEDICINE CLINIC | Facility: CLINIC | Age: 65
End: 2020-10-05

## 2020-10-06 ENCOUNTER — TELEPHONE (OUTPATIENT)
Dept: FAMILY MEDICINE CLINIC | Facility: CLINIC | Age: 65
End: 2020-10-06

## 2020-10-06 DIAGNOSIS — K63.9 BOWEL TROUBLE: ICD-10-CM

## 2020-10-06 DIAGNOSIS — K59.00 CONSTIPATION, UNSPECIFIED CONSTIPATION TYPE: Primary | ICD-10-CM

## 2020-10-06 DIAGNOSIS — R10.30 LOWER ABDOMINAL PAIN: Primary | ICD-10-CM

## 2020-10-06 NOTE — TELEPHONE ENCOUNTER
Has been having issues having a bowel movement for approx 1 month. She has tried magnesium citrate, and laxatives.  She is concerned and inquiring if she needs to go to the hospital and be checked for a blockage.

## 2020-10-06 NOTE — TELEPHONE ENCOUNTER
Kayla,  Please advise on if you would like to order a KUB or have patient proceed to ER. Thank you.

## 2020-10-06 NOTE — TELEPHONE ENCOUNTER
Called patient. Patient's identity verified. Advised her of CBC results. She voiced understanding.

## 2020-10-06 NOTE — TELEPHONE ENCOUNTER
PATIENT IS REQUESTING A REFERRAL TO GASTRO.  SHE HAS SPOKEN TO ONE AT [facility].  I AM PLACING THE REFERRAL TO BE SIGNED OFF ON.  THANK YOU

## 2020-10-08 ENCOUNTER — TELEPHONE (OUTPATIENT)
Dept: FAMILY MEDICINE CLINIC | Facility: CLINIC | Age: 65
End: 2020-10-08

## 2020-10-08 NOTE — TELEPHONE ENCOUNTER
CALLED PT. NO ANSWER. PER HIPPA,MAY LEAVE DETAILED VM. VM LEFT ADVISING PT OF KUB XR RESULTS AND TO CONTACT OFFICE WITH QUESTIONS OR CONCERNS.

## 2020-10-08 NOTE — TELEPHONE ENCOUNTER
----- Message from JW Tsang sent at 10/8/2020  2:36 PM EDT -----  Did she see Dr Valle  Shows severe constipation and dehyrdation  Please advise

## 2020-10-13 RX ORDER — LEVOTHYROXINE SODIUM 112 UG/1
TABLET ORAL
Qty: 15 TABLET | Refills: 2 | Status: SHIPPED | OUTPATIENT
Start: 2020-10-13 | End: 2021-01-11

## 2020-10-22 DIAGNOSIS — I10 ESSENTIAL HYPERTENSION: ICD-10-CM

## 2020-10-22 NOTE — TELEPHONE ENCOUNTER
Date of last refill:    LISINOPRIL: 8-  METFORMIN: 8-31--2020    Is there an Inspect on file: N/A    Last appt date: 7-     Next appt date: NONE      Additional information:

## 2020-10-23 RX ORDER — LISINOPRIL 10 MG/1
TABLET ORAL
Qty: 90 TABLET | Refills: 0 | Status: SHIPPED | OUTPATIENT
Start: 2020-10-23 | End: 2021-02-19

## 2020-11-10 ENCOUNTER — OFFICE VISIT (OUTPATIENT)
Dept: FAMILY MEDICINE CLINIC | Facility: CLINIC | Age: 65
End: 2020-11-10

## 2020-11-10 VITALS
HEART RATE: 85 BPM | OXYGEN SATURATION: 98 % | WEIGHT: 199.4 LBS | RESPIRATION RATE: 18 BRPM | DIASTOLIC BLOOD PRESSURE: 89 MMHG | BODY MASS INDEX: 33.22 KG/M2 | TEMPERATURE: 97.3 F | SYSTOLIC BLOOD PRESSURE: 145 MMHG | HEIGHT: 65 IN

## 2020-11-10 DIAGNOSIS — K63.5 POLYP OF COLON, UNSPECIFIED PART OF COLON, UNSPECIFIED TYPE: ICD-10-CM

## 2020-11-10 DIAGNOSIS — R10.84 GENERALIZED ABDOMINAL PAIN: ICD-10-CM

## 2020-11-10 DIAGNOSIS — E11.69 TYPE 2 DIABETES MELLITUS WITH OTHER SPECIFIED COMPLICATION, UNSPECIFIED WHETHER LONG TERM INSULIN USE (HCC): ICD-10-CM

## 2020-11-10 DIAGNOSIS — K59.09 OTHER CONSTIPATION: ICD-10-CM

## 2020-11-10 DIAGNOSIS — Z12.31 ENCOUNTER FOR SCREENING MAMMOGRAM FOR MALIGNANT NEOPLASM OF BREAST: Primary | ICD-10-CM

## 2020-11-10 DIAGNOSIS — E03.9 ACQUIRED HYPOTHYROIDISM: ICD-10-CM

## 2020-11-10 PROCEDURE — 99214 OFFICE O/P EST MOD 30 MIN: CPT | Performed by: FAMILY MEDICINE

## 2020-11-10 RX ORDER — AMOXICILLIN 250 MG
2 CAPSULE ORAL NIGHTLY
COMMUNITY

## 2020-11-10 RX ORDER — POLYETHYLENE GLYCOL 3350 17 G/17G
17 POWDER, FOR SOLUTION ORAL 2 TIMES DAILY
COMMUNITY

## 2020-11-10 RX ORDER — METFORMIN HYDROCHLORIDE 500 MG/1
500 TABLET, EXTENDED RELEASE ORAL 2 TIMES DAILY WITH MEALS
Qty: 180 TABLET | Refills: 0 | Status: SHIPPED | OUTPATIENT
Start: 2020-11-10 | End: 2021-03-03

## 2020-11-10 NOTE — PROGRESS NOTES
Subjective   Jack Hidalgo is a 64 y.o. female.     Chief Complaint   Patient presents with   • Constipation     MOTHER PASSED AWAY OF COLON CA; CONSTIPATION X  3 MOS EVEN WITH USE OF MIRALAX TID        History of Present Illness   Pt has hx colon polyps followed in the past by [facility]. Last scope was   Mother  of colon cancer  She has had intermittent constipation, lately worsened despite taling miralax, senokoat and suppositories.  She does have colonoscopy scheduled with dr. concepcion 2020 in [place] as she could not get scheduled with [facility]  Will start trial Linzess 145 ( sampled    The following portions of the patient's history were reviewed and updated as appropriate: allergies, current medications, past family history, past medical history, past social history, past surgical history and problem list.    Past Medical History:   Diagnosis Date   • B12 deficiency    • Biliary dyskinesia 2012   • Chronic constipation    • Colon polyps    • Dyslipidemia 2019   • Fatty liver disease, nonalcoholic     Abstraction from [employer] [facility] [employer]   • Gastroesophageal reflux disease 3/5/2019   • GERD (gastroesophageal reflux disease)    • Hearing loss, bilateral     Bilateral hearing aids   • Hypertension    • Hypothyroidism    • Morbid obesity (CMS/HCC)    • Osteopenia    • Type 2 diabetes mellitus (CMS/HCC)    • Vulvar intraepithelial neoplasia (MARTÍNEZ)     Her GYN is Dr Garcia       Past Surgical History:   Procedure Laterality Date   • BLADDER REPAIR     • BREAST SURGERY      Breast Lift   • CHOLECYSTECTOMY  2012    Lap   • COLONOSCOPY      with 9 polyps 2 were pre cancerous Dr Guzman   • HERNIA REPAIR  2018    Double Hernias- [facility]   • HYSTERECTOMY     • LIPOMA EXCISION      Lipoma & Sub Cyst   • LIPOSUCTION     • OTHER SURGICAL HISTORY      V[facility]blation ([facility])   • OTHER SURGICAL HISTORY Right 2012    Leg Ablation   • THYROID SURGERY     • VEIN SURGERY      Vein  Removal       Family History   Problem Relation Age of Onset   • Cancer Mother         Colon Cancer   • Diabetes Father         DMII   • Kidney disease Father    • Osteoporosis Father    • Liver disease Sister    • Other Brother         MRSA   • Cancer Brother         Facial and Neck Cancer   • Rheumatic fever Other    • Hypertension Other        Review of Systems   Constitutional: Negative for fatigue, unexpected weight gain and unexpected weight loss.   HENT: Negative for congestion, sinus pressure and sore throat.    Eyes: Negative for blurred vision and visual disturbance.   Respiratory: Negative for cough, shortness of breath and wheezing.    Cardiovascular: Negative for chest pain, palpitations and leg swelling.   Gastrointestinal: Positive for abdominal pain and constipation. Negative for diarrhea, nausea, vomiting, GERD and indigestion.   Musculoskeletal: Negative for arthralgias, back pain, gait problem, joint swelling and neck pain.   Skin: Negative for rash, skin lesions and bruise.   Allergic/Immunologic: Negative for environmental allergies.   Neurological: Negative for dizziness, tremors, syncope, weakness, light-headedness, headache and memory problem.   Psychiatric/Behavioral: Negative for sleep disturbance, depressed mood and stress. The patient is not nervous/anxious.        Objective   Physical Exam  Vitals signs and nursing note reviewed.   Constitutional:       General: She is not in acute distress.     Appearance: Normal appearance. She is well-developed. She is obese. She is not diaphoretic.   HENT:      Head: Normocephalic and atraumatic.      Right Ear: External ear normal.      Left Ear: External ear normal.      Nose: Nose normal.   Eyes:      Pupils: Pupils are equal, round, and reactive to light.   Neck:      Musculoskeletal: Normal range of motion and neck supple.      Thyroid: No thyromegaly.   Cardiovascular:      Rate and Rhythm: Normal rate and regular rhythm.      Heart sounds:  Normal heart sounds. No murmur. No friction rub. No gallop.    Pulmonary:      Effort: Pulmonary effort is normal.      Breath sounds: No wheezing.   Abdominal:      General: Bowel sounds are normal.      Palpations: Abdomen is soft.      Tenderness: There is abdominal tenderness. There is no guarding or rebound.   Musculoskeletal: Normal range of motion.         General: No tenderness or deformity.   Lymphadenopathy:      Cervical: No cervical adenopathy.   Skin:     General: Skin is warm and dry.      Findings: No rash.   Neurological:      Mental Status: She is alert and oriented to person, place, and time.      Cranial Nerves: No cranial nerve deficit.   Psychiatric:         Behavior: Behavior normal.         Thought Content: Thought content normal.         Judgment: Judgment normal.         Vitals:    11/10/20 1106   BP: 145/89   Pulse: 85   Resp: 18   Temp: 97.3 °F (36.3 °C)   SpO2: 98%     Body mass index is 33.18 kg/m².    Hemoglobin A1C   Date Value Ref Range Status   03/12/2020 8.2 % Final   12/12/2019 7.6 % Final   09/19/2019 7.1 % Final     Glucose   Date Value Ref Range Status   03/12/2020 143 (A) 70 - 130 mg/dL Final   12/12/2019 152 (A) 70 - 130 mg/dL Final   09/19/2019 114 70 - 130 mg/dL Final     LDL Cholesterol    Date Value Ref Range Status   12/12/2019 20 0 - 100 mg/dL Final   06/04/2019 31 0 - 100 mg/dL Final     TSH   Date Value Ref Range Status   12/12/2019 2.540 0.270 - 4.200 uIU/mL Final   06/04/2019 1.48 0.34 - 5.60 uIU/mL Final     Comment:     (SEE BELOW)  NOTE: Results may be falsely decreased if patient taking Biotin       Results for orders placed or performed in visit on 09/21/20   CBC Auto Differential    Specimen: Arm, Right; Blood   Result Value Ref Range    WBC 6.29 3.40 - 10.80 10*3/mm3    RBC 4.98 3.77 - 5.28 10*6/mm3    Hemoglobin 14.1 12.0 - 15.9 g/dL    Hematocrit 42.5 34.0 - 46.6 %    MCV 85.3 79.0 - 97.0 fL    MCH 28.3 26.6 - 33.0 pg    MCHC 33.2 31.5 - 35.7 g/dL    RDW  13.0 12.3 - 15.4 %    RDW-SD 40.2 37.0 - 54.0 fl    MPV 11.1 6.0 - 12.0 fL    Platelets 172 140 - 450 10*3/mm3    Neutrophil % 50.0 42.7 - 76.0 %    Lymphocyte % 38.2 19.6 - 45.3 %    Monocyte % 7.8 5.0 - 12.0 %    Eosinophil % 3.3 0.3 - 6.2 %    Basophil % 0.5 0.0 - 1.5 %    Immature Grans % 0.2 0.0 - 0.5 %    Neutrophils, Absolute 3.15 1.70 - 7.00 10*3/mm3    Lymphocytes, Absolute 2.40 0.70 - 3.10 10*3/mm3    Monocytes, Absolute 0.49 0.10 - 0.90 10*3/mm3    Eosinophils, Absolute 0.21 0.00 - 0.40 10*3/mm3    Basophils, Absolute 0.03 0.00 - 0.20 10*3/mm3    Immature Grans, Absolute 0.01 0.00 - 0.05 10*3/mm3    nRBC 0.0 0.0 - 0.2 /100 WBC   Results for orders placed or performed in visit on 03/12/20   POC Glycosylated Hemoglobin (Hb A1C)    Specimen: Blood   Result Value Ref Range    Hemoglobin A1C 8.2 %   POC Glucose    Specimen: Blood   Result Value Ref Range    Glucose 143 (A) 70 - 130 mg/dL   Results for orders placed or performed in visit on 12/12/19   CBC Auto Differential    Specimen: Arm, Left; Blood   Result Value Ref Range    WBC 5.69 3.40 - 10.80 10*3/mm3    RBC 5.38 (H) 3.77 - 5.28 10*6/mm3    Hemoglobin 15.0 12.0 - 15.9 g/dL    Hematocrit 44.5 34.0 - 46.6 %    MCV 82.7 79.0 - 97.0 fL    MCH 27.9 26.6 - 33.0 pg    MCHC 33.7 31.5 - 35.7 g/dL    RDW 12.6 12.3 - 15.4 %    RDW-SD 38.0 37.0 - 54.0 fl    MPV 11.2 6.0 - 12.0 fL    Platelets 166 140 - 450 10*3/mm3    Neutrophil % 50.1 42.7 - 76.0 %    Lymphocyte % 39.9 19.6 - 45.3 %    Monocyte % 7.7 5.0 - 12.0 %    Eosinophil % 1.6 0.3 - 6.2 %    Basophil % 0.5 0.0 - 1.5 %    Immature Grans % 0.2 0.0 - 0.5 %    Neutrophils, Absolute 2.85 1.70 - 7.00 10*3/mm3    Lymphocytes, Absolute 2.27 0.70 - 3.10 10*3/mm3    Monocytes, Absolute 0.44 0.10 - 0.90 10*3/mm3    Eosinophils, Absolute 0.09 0.00 - 0.40 10*3/mm3    Basophils, Absolute 0.03 0.00 - 0.20 10*3/mm3    Immature Grans, Absolute 0.01 0.00 - 0.05 10*3/mm3    nRBC 0.0 0.0 - 0.2 /100 WBC   POC Glycosylated  Hemoglobin (Hb A1C)    Specimen: Blood   Result Value Ref Range    Hemoglobin A1C 7.6 %   Vitamin D 25 Hydroxy    Specimen: Arm, Left; Blood   Result Value Ref Range    25 Hydroxy, Vitamin D 43.0 30.0 - 100.0 ng/ml   POC Glucose    Specimen: Blood   Result Value Ref Range    Glucose 152 (A) 70 - 130 mg/dL   TSH    Specimen: Arm, Left; Blood   Result Value Ref Range    TSH 2.540 0.270 - 4.200 uIU/mL   T4, Free    Specimen: Arm, Left; Blood   Result Value Ref Range    Free T4 1.32 0.93 - 1.70 ng/dL   Vitamin B12    Specimen: Arm, Left; Blood   Result Value Ref Range    Vitamin B-12 583 211 - 946 pg/mL   Lipid Panel    Specimen: Arm, Left; Blood   Result Value Ref Range    Total Cholesterol 128 0 - 200 mg/dL    Triglycerides 54 0 - 150 mg/dL    HDL Cholesterol 97 (H) 40 - 60 mg/dL    LDL Cholesterol  20 0 - 100 mg/dL    VLDL Cholesterol 10.8 5 - 40 mg/dL    LDL/HDL Ratio 0.21    Comprehensive Metabolic Panel    Specimen: Arm, Left; Blood   Result Value Ref Range    Glucose 162 (H) 65 - 99 mg/dL    BUN 16 8 - 23 mg/dL    Creatinine 0.75 0.57 - 1.00 mg/dL    Sodium 140 136 - 145 mmol/L    Potassium 5.1 3.5 - 5.2 mmol/L    Chloride 105 98 - 107 mmol/L    CO2 26.7 22.0 - 29.0 mmol/L    Calcium 12.0 (H) 8.6 - 10.5 mg/dL    Total Protein 7.5 6.0 - 8.5 g/dL    Albumin 4.60 3.50 - 5.20 g/dL    ALT (SGPT) 49 (H) 1 - 33 U/L    AST (SGOT) 31 1 - 32 U/L    Alkaline Phosphatase 122 (H) 39 - 117 U/L    Total Bilirubin 0.7 0.2 - 1.2 mg/dL    eGFR Non African Amer 78 >60 mL/min/1.73    Globulin 2.9 gm/dL    A/G Ratio 1.6 g/dL    BUN/Creatinine Ratio 21.3 7.0 - 25.0    Anion Gap 8.3 5.0 - 15.0 mmol/L   Results for orders placed or performed in visit on 09/19/19   POC Glycosylated Hemoglobin (Hb A1C)    Specimen: Blood   Result Value Ref Range    Hemoglobin A1C 7.1 %   POCT Glucose    Specimen: Blood   Result Value Ref Range    Glucose 114 70 - 130 mg/dL   Results for orders placed or performed in visit on 07/17/19   POCT Glucose     Specimen: Blood   Result Value Ref Range    Glucose 114 70 - 130 mg/dL   Results for orders placed or performed in visit on 07/11/19    COLONOSCOPY   Result Value Ref Range     Colonoscopy See Report     MAMMOGRAPHY   Result Value Ref Range     Mammogram See Report    Results for orders placed or performed in visit on 10/10/17   CBC Auto Differential    Specimen: Blood   Result Value Ref Range    WBC 5.78 4.50 - 10.70 10*3/mm3    RBC 4.94 3.90 - 5.20 10*6/mm3    Hemoglobin 14.0 11.9 - 15.5 g/dL    Hematocrit 42.2 35.6 - 45.5 %    MCV 85.4 80.5 - 98.2 fL    MCH 28.3 26.9 - 32.0 pg    MCHC 33.2 32.4 - 36.3 g/dL    RDW 13.2 (H) 11.7 - 13.0 %    RDW-SD 41.1 37.0 - 54.0 fl    MPV 9.7 6.0 - 12.0 fL    Platelets 129 (L) 140 - 500 10*3/mm3    Neutrophil % 51.2 42.7 - 76.0 %    Lymphocyte % 37.2 19.6 - 45.3 %    Monocyte % 8.3 5.0 - 12.0 %    Eosinophil % 2.4 0.3 - 6.2 %    Basophil % 0.7 0.0 - 1.5 %    Immature Grans % 0.2 0.0 - 0.5 %    Neutrophils, Absolute 2.96 1.90 - 8.10 10*3/mm3    Lymphocytes, Absolute 2.15 0.90 - 4.80 10*3/mm3    Monocytes, Absolute 0.48 0.20 - 1.20 10*3/mm3    Eosinophils, Absolute 0.14 0.00 - 0.70 10*3/mm3    Basophils, Absolute 0.04 0.00 - 0.20 10*3/mm3    Immature Grans, Absolute 0.01 0.00 - 0.03 10*3/mm3    nRBC 0.0 0.0 - 0.0 /100 WBC     *Note: Due to a large number of results and/or encounters for the requested time period, some results have not been displayed. A complete set of results can be found in Results Review.       Assessment/Plan   Diagnoses and all orders for this visit:    1. Encounter for screening mammogram for malignant neoplasm of breast (Primary)  -     Mammo Screening Bilateral With CAD; Future    2. Generalized abdominal pain  -     CT Abdomen Pelvis Without Contrast; Future  -     CBC Auto Differential; Future  -     Comprehensive Metabolic Panel; Future    3. Polyp of colon, unspecified part of colon, unspecified type  -     CT Abdomen Pelvis Without Contrast;  Future    4. Other constipation  -     linaclotide (LINZESS) 145 MCG capsule capsule; Take 1 capsule by mouth Every Morning Before Breakfast.  Dispense: 8 capsule; Refill: 0    5. Acquired hypothyroidism  -     TSH+Free T4; Future    6. Type 2 diabetes mellitus with other specified complication, unspecified whether long term insulin use (CMS/HCC)  -     metFORMIN ER (Glucophage XR) 500 MG 24 hr tablet; Take 1 tablet by mouth 2 (Two) Times a Day With Meals.  Dispense: 180 tablet; Refill: 0  -     Cancel: POC Glycosylated Hemoglobin (Hb A1C)  -     Hemoglobin A1c; Future      Trial Linzess 145 daily, can go up to 2 tabldaily as needed  Keep fu with Dr. Mustafa formcolonoscopy  Fu labs  Fu Ct abdomen/pelvis  Schedule mammogram

## 2020-11-12 ENCOUNTER — TELEPHONE (OUTPATIENT)
Dept: FAMILY MEDICINE CLINIC | Facility: CLINIC | Age: 65
End: 2020-11-12

## 2020-12-29 ENCOUNTER — TELEPHONE (OUTPATIENT)
Dept: FAMILY MEDICINE CLINIC | Facility: CLINIC | Age: 65
End: 2020-12-29

## 2021-01-04 RX ORDER — AMLODIPINE BESYLATE 5 MG/1
5 TABLET ORAL DAILY
Qty: 90 TABLET | Refills: 1 | Status: SHIPPED | OUTPATIENT
Start: 2021-01-04 | End: 2021-07-07

## 2021-01-04 RX ORDER — ATORVASTATIN CALCIUM 10 MG/1
10 TABLET, FILM COATED ORAL DAILY
Qty: 90 TABLET | Refills: 1 | Status: SHIPPED | OUTPATIENT
Start: 2021-01-04 | End: 2021-08-03

## 2021-01-06 ENCOUNTER — TELEMEDICINE (OUTPATIENT)
Dept: FAMILY MEDICINE CLINIC | Facility: CLINIC | Age: 66
End: 2021-01-06

## 2021-01-06 DIAGNOSIS — K59.09 CONSTIPATION, CHRONIC: Primary | ICD-10-CM

## 2021-01-06 DIAGNOSIS — E78.2 MIXED HYPERLIPIDEMIA: ICD-10-CM

## 2021-01-06 DIAGNOSIS — I10 ESSENTIAL HYPERTENSION: ICD-10-CM

## 2021-01-06 PROCEDURE — 99213 OFFICE O/P EST LOW 20 MIN: CPT | Performed by: NURSE PRACTITIONER

## 2021-01-06 NOTE — PROGRESS NOTES
Chief Complaint   Patient presents with   • Hemorrhoids   • Medication Problem     Needs alternative for medication due to insurance         Subjective   Jack Hidalgo is a 65 y.o.  female who presents today for   • Hemorrhoids reports these as chronic. Miralax is working well for her with the constipation   • Medication Problem    Needs alternative for medication due to insurance       Jack Hidalgo  has a past medical history of B12 deficiency, Biliary dyskinesia (09/2012), Chronic constipation, Colon polyps, Dyslipidemia (6/4/2019), Fatty liver disease, nonalcoholic, Gastroesophageal reflux disease (3/5/2019), GERD (gastroesophageal reflux disease), Hearing loss, bilateral, Hypertension, Hypothyroidism, Morbid obesity (CMS/HCC), Osteopenia, Type 2 diabetes mellitus (CMS/HCC), and Vulvar intraepithelial neoplasia (MARTÍNEZ).    Allergies   Allergen Reactions   • Butorphanol Anaphylaxis   • Cephalexin Hives   • Lorazepam Hives   • Penicillin G Hives       Current Outpatient Medications:   •  Acetaminophen 325 MG capsule, Take 1 tablet by mouth Daily As Needed., Disp: , Rfl:   •  amLODIPine (NORVASC) 5 MG tablet, Take 1 tablet by mouth Daily., Disp: 90 tablet, Rfl: 1  •  atorvastatin (LIPITOR) 10 MG tablet, Take 1 tablet by mouth Daily., Disp: 90 tablet, Rfl: 1  •  Calcium Carbonate (CALCIUM 500 PO), Take 600 mg by mouth Daily., Disp: , Rfl:   •  cholecalciferol (VITAMIN D3) 25 MCG (1000 UT) tablet, Take 2,000 Units by mouth Daily., Disp: , Rfl:   •  glucose blood (FREESTYLE TEST STRIPS) test strip, 1 each by Other route Daily., Disp: , Rfl:   •  metFORMIN ER (Glucophage XR) 500 MG 24 hr tablet, Take 1 tablet by mouth 2 (Two) Times a Day With Meals., Disp: 180 tablet, Rfl: 0  •  polyethylene glycol (MiraLax) 17 g packet, Take 17 g by mouth 2 (Two) Times a Day. MIX IN 8 OZS OF FLUID OF CHOICE EXCEPT MILK , Disp: , Rfl:   •  sennosides-docusate (senna-docusate sodium) 8.6-50 MG per tablet, Take 2 tablets by mouth  Every Night., Disp: , Rfl:   •  aspirin (ADULT ASPIRIN EC LOW STRENGTH) 81 MG EC tablet, Take 81 mg by mouth Daily., Disp: , Rfl:   •  BD Pen Needle Diana U/F 32G X 4 MM misc, USE AS DIRECTED FOR INSULIN INJECTIONS, Disp: 90 each, Rfl: 1  •  bimatoprost (LATISSE) 0.03 % ophthalmic solution, PLACE 1 DROP ON APPLICATOR AND APPLY ALONG SKIN OF UPPER EYELID AT BASE OF EYELASHES DAILY AT BEDTIME REPEAT FOR 2ND EYE WITH CLEAN APPLICATOR, Disp: 3 mL, Rfl: 1  •  levothyroxine (SYNTHROID, LEVOTHROID) 112 MCG tablet, TAKE ONE-HALF (1/2) TABLET BY MOUTH IN THE MORNING 30-60 MINUTES PRIOR TO FIRST MEAL OF DAY ON EMPTY STOMACH, Disp: 45 tablet, Rfl: 0  •  linaclotide (LINZESS) 145 MCG capsule capsule, Take 1 capsule by mouth Every Morning Before Breakfast., Disp: 8 capsule, Rfl: 0  •  lisinopril (PRINIVIL,ZESTRIL) 10 MG tablet, TAKE 1 TABLET BY MOUTH EVERY DAY, Disp: 90 tablet, Rfl: 0  •  Phenylephrine-Cocoa Butter (RA Hemorrhoid Relief) 0.25-85.5 % suppository, Insert 1 suppository into the rectum 2 (Two) Times a Day As Needed (hemmorhoids)., Disp: 101 suppository, Rfl: 1  •  Victoza 18 MG/3ML solution pen-injector injection, INJECT 1.8 MG SUBCUTANEOUSLY DAILY AS DIRECTED, Disp: 9 pen, Rfl: 2  Past Medical History:   Diagnosis Date   • B12 deficiency    • Biliary dyskinesia 09/2012   • Chronic constipation    • Colon polyps    • Dyslipidemia 6/4/2019   • Fatty liver disease, nonalcoholic    • Gastroesophageal reflux disease 3/5/2019   • GERD (gastroesophageal reflux disease)    • Hearing loss, bilateral    • Hypertension    • Hypothyroidism    • Morbid obesity (CMS/HCC)    • Osteopenia    • Type 2 diabetes mellitus (CMS/HCC)    • Vulvar intraepithelial neoplasia (MARTÍNEZ)      Past Surgical History:   Procedure Laterality Date   • BLADDER REPAIR     • BREAST SURGERY      Breast Lift   • CHOLECYSTECTOMY  09/28/2012    Lap   • COLONOSCOPY      with 9 polyps 2 were pre cancerous Dr Guzman   • HERNIA REPAIR  2018    Double Hernias- [facility]    • HERNIA REPAIR  11/16/2020   • HYSTERECTOMY     • LIPOMA EXCISION      Lipoma & Sub Cyst   • LIPOSUCTION     • OTHER SURGICAL HISTORY  2011    Vaginal Ablation ([facility])   • OTHER SURGICAL HISTORY Right 2012    Leg Ablation   • THYROID SURGERY     • VEIN SURGERY      Vein Removal     Social History     Socioeconomic History   • Marital status:      Spouse name: Not on file   • Number of children: Not on file   • Years of education: Not on file   • Highest education level: Not on file   Tobacco Use   • Smoking status: Never Smoker   • Smokeless tobacco: Never Used   • Tobacco comment: Passive Smoke: N   Substance and Sexual Activity   • Alcohol use: No     Frequency: Never   • Drug use: No   • Sexual activity: Yes     Partners: Male     Family History   Problem Relation Age of Onset   • Cancer Mother         Colon Cancer   • Diabetes Father         DMII   • Kidney disease Father    • Osteoporosis Father    • Liver disease Sister    • Other Brother         MRSA   • Cancer Brother         Facial and Neck Cancer   • Rheumatic fever Other    • Hypertension Other      PHQ-2 Depression Screening  Little interest or pleasure in doing things?     Feeling down, depressed, or hopeless?     PHQ-2 Total Score       PHQ-9 Depression Screening  Little interest or pleasure in doing things?     Feeling down, depressed, or hopeless?     Trouble falling or staying asleep, or sleeping too much?     Feeling tired or having little energy?     Poor appetite or overeating?     Feeling bad about yourself - or that you are a failure or have let yourself or your family down?     Trouble concentrating on things, such as reading the newspaper or watching television?     Moving or speaking so slowly that other people could have noticed? Or the opposite - being so fidgety or restless that you have been moving around a lot more than usual?     Thoughts that you would be better off dead, or of hurting yourself in some  way?     PHQ-9 Total Score     If you checked off any problems, how difficult have these problems made it for you to do your work, take care of things at home, or get along with other people?         Family history, surgical history, past medical history, Allergies and med's reviewed with patient today and updated in [facility] EMR.     ROS:  Review of Systems   All other systems reviewed and are negative.      OBJECTIVE:  There were no vitals filed for this visit.  There is no height or weight on file to calculate BMI.  You have chosen to receive care through a telehealth visit.  Do you consent to use a video/audio connection for your medical care today? Yes      ASSESSMENT/ PLAN:    Diagnoses and all orders for this visit:    1. Constipation, chronic (Primary)    2. Essential hypertension    3. Mixed hyperlipidemia    Other orders  -     Phenylephrine-Cocoa Butter (RA Hemorrhoid Relief) 0.25-85.5 % suppository; Insert 1 suppository into the rectum 2 (Two) Times a Day As Needed (hemmorhoids).  Dispense: 101 suppository; Refill: 1        Orders Placed Today:     New Medications Ordered This Visit   Medications   • Phenylephrine-Cocoa Butter (RA Hemorrhoid Relief) 0.25-85.5 % suppository     Sig: Insert 1 suppository into the rectum 2 (Two) Times a Day As Needed (hemmorhoids).     Dispense:  101 suppository     Refill:  1        Management Plan:   Discuss with surgeon the use of hemorrhoids due to recent abdominal surgery  She is doing well since surgery her only issues has been constipation  Would like a colonoscopy but general surgeon does not recommend it at this time      An After Visit Summary was printed and given to the patient at discharge.    Follow-up: No follow-ups on file.    Kayla Mendoza, APRN 2/21/2021 22:03 EST  This note was electronically signed.

## 2021-01-07 RX ORDER — PSEUDOEPHEDRINE HCL 120 MG/1
1 TABLET, FILM COATED, EXTENDED RELEASE ORAL 2 TIMES DAILY PRN
Qty: 101 SUPPOSITORY | Refills: 1 | Status: SHIPPED | OUTPATIENT
Start: 2021-01-07

## 2021-01-11 RX ORDER — LEVOTHYROXINE SODIUM 112 UG/1
TABLET ORAL
Qty: 45 TABLET | Refills: 0 | Status: SHIPPED | OUTPATIENT
Start: 2021-01-11 | End: 2021-04-05

## 2021-01-11 NOTE — TELEPHONE ENCOUNTER
Date of last refill: 10-    Is there an Inspect on file: N/A     Last appt date: 1-6-2021     Next appt date: NONE       Additional information:  LAST TSH AND FT4 WAS IN NOV 2020 AND WAS WNL

## 2021-02-19 DIAGNOSIS — I10 ESSENTIAL HYPERTENSION: ICD-10-CM

## 2021-02-19 DIAGNOSIS — E11.69 TYPE 2 DIABETES MELLITUS WITH OTHER SPECIFIED COMPLICATION, UNSPECIFIED WHETHER LONG TERM INSULIN USE (HCC): Primary | ICD-10-CM

## 2021-02-19 RX ORDER — PEN NEEDLE, DIABETIC 32GX 5/32"
NEEDLE, DISPOSABLE MISCELLANEOUS
Qty: 90 EACH | Refills: 1 | Status: SHIPPED | OUTPATIENT
Start: 2021-02-19 | End: 2021-09-28

## 2021-02-19 RX ORDER — BIMATOPROST 3 UG/ML
SOLUTION TOPICAL
Qty: 3 ML | Refills: 1 | Status: SHIPPED | OUTPATIENT
Start: 2021-02-19 | End: 2021-11-09

## 2021-02-19 RX ORDER — LISINOPRIL 10 MG/1
TABLET ORAL
Qty: 90 TABLET | Refills: 0 | Status: SHIPPED | OUTPATIENT
Start: 2021-02-19 | End: 2021-05-12

## 2021-02-19 RX ORDER — LIRAGLUTIDE 6 MG/ML
INJECTION SUBCUTANEOUS
Qty: 9 PEN | Refills: 2 | Status: SHIPPED | OUTPATIENT
Start: 2021-02-19 | End: 2021-11-29

## 2021-02-19 NOTE — TELEPHONE ENCOUNTER
Date of last refill:    LISINOPRIL: 10-  VICTOZA: 8-  LATISSE: 7-  PEN NEEDLES: 12-    Is there an Inspect on file: N/A     Last appt date: 1-6-2021     Next appt date: NONE      Additional information:

## 2021-03-02 DIAGNOSIS — E11.69 TYPE 2 DIABETES MELLITUS WITH OTHER SPECIFIED COMPLICATION, UNSPECIFIED WHETHER LONG TERM INSULIN USE (HCC): ICD-10-CM

## 2021-03-02 NOTE — TELEPHONE ENCOUNTER
Date of last refill: 11-    Is there an Inspect on file: N/A    Last appt date: 1-6-2021     Next appt date: NONE      Additional information:

## 2021-03-03 RX ORDER — METFORMIN HYDROCHLORIDE 500 MG/1
TABLET, EXTENDED RELEASE ORAL
Qty: 180 TABLET | Refills: 0 | Status: SHIPPED | OUTPATIENT
Start: 2021-03-03 | End: 2021-05-26

## 2021-04-05 RX ORDER — LEVOTHYROXINE SODIUM 112 UG/1
TABLET ORAL
Qty: 135 TABLET | Refills: 0 | Status: SHIPPED | OUTPATIENT
Start: 2021-04-05 | End: 2021-12-29

## 2021-04-05 NOTE — TELEPHONE ENCOUNTER
Date of last refill: 1-    Is there an Inspect on file: N/A     Last appt date: 1-6-2021     Next appt date: NONE      Additional information:

## 2021-04-13 ENCOUNTER — TELEPHONE (OUTPATIENT)
Dept: FAMILY MEDICINE CLINIC | Facility: CLINIC | Age: 66
End: 2021-04-13

## 2021-04-13 DIAGNOSIS — Z12.11 SCREENING FOR COLON CANCER: Primary | ICD-10-CM

## 2021-04-13 DIAGNOSIS — K63.5 POLYP OF COLON, UNSPECIFIED PART OF COLON, UNSPECIFIED TYPE: ICD-10-CM

## 2021-04-13 NOTE — TELEPHONE ENCOUNTER
REFERRAL CREATED. REFERRAL FAXED TO DR. VEGA'S OFFICE. PATIENT NOTIFIED. SHE VOICED UNDERSTANDING AND APPRECIATION.

## 2021-04-13 NOTE — TELEPHONE ENCOUNTER
Caller: Jack Hidalgo    Relationship: Self    Best call back number: [phone]    What is the medical concern/diagnosis: ROUTINE COLONOSCOPY    What specialty or service is being requested: GASTROENTEROLOGY    What is the provider, practice or medical service name: DR VEAG    What is the office location: [facility]    What is the office phone number: [phone]    HER OLD REFERRAL

## 2021-04-26 ENCOUNTER — TELEPHONE (OUTPATIENT)
Dept: FAMILY MEDICINE CLINIC | Facility: CLINIC | Age: 66
End: 2021-04-26

## 2021-04-26 DIAGNOSIS — K21.9 GASTROESOPHAGEAL REFLUX DISEASE, UNSPECIFIED WHETHER ESOPHAGITIS PRESENT: Primary | ICD-10-CM

## 2021-04-26 NOTE — TELEPHONE ENCOUNTER
Caller: Jack Hidalgo    Relationship: Self    Best call back number: [phone]     What is the medical concern/diagnosis: N/A    What specialty or service is being requested: EGD    What is the provider, practice or medical service name:DR NATALEE VEGA    What is the office location: [place]    What is the office phone number: [phone]    Any additional details: PATIENT IS NEEDING A NEW REFERRAL TO DR VEGA THAT ALSO STATES IT IS FOR AN EGD IN ADDITION TO THE COLONOSCOPY BEING DONE ON April 15, 21

## 2021-04-27 DIAGNOSIS — K21.9 GASTROESOPHAGEAL REFLUX DISEASE, UNSPECIFIED WHETHER ESOPHAGITIS PRESENT: Primary | ICD-10-CM

## 2021-05-11 DIAGNOSIS — I10 ESSENTIAL HYPERTENSION: ICD-10-CM

## 2021-05-12 RX ORDER — LISINOPRIL 10 MG/1
TABLET ORAL
Qty: 90 TABLET | Refills: 0 | Status: SHIPPED | OUTPATIENT
Start: 2021-05-12 | End: 2021-08-03

## 2021-05-12 NOTE — TELEPHONE ENCOUNTER
Date of last refill: 2-    Is there an Inspect on file: N/A     Last appt date: 1-6-2021     Next appt date: NONE      Additional information:  WILL CONTACT PT TO SCHEDULE APPT.

## 2021-05-26 DIAGNOSIS — E11.69 TYPE 2 DIABETES MELLITUS WITH OTHER SPECIFIED COMPLICATION, UNSPECIFIED WHETHER LONG TERM INSULIN USE (HCC): ICD-10-CM

## 2021-05-26 RX ORDER — METFORMIN HYDROCHLORIDE 500 MG/1
TABLET, EXTENDED RELEASE ORAL
Qty: 180 TABLET | Refills: 0 | Status: SHIPPED | OUTPATIENT
Start: 2021-05-26 | End: 2021-08-27

## 2021-05-26 NOTE — TELEPHONE ENCOUNTER
Date of last refill: 3-3-2021    Is there an Inspect on file: N/A     Last appt date: 1-6-2021     Next appt date: NONE       Additional information:  PLACED ON LIST TO CONTACT TO SCHEDULE FOR FOLLOW UP APPOINTMENT

## 2021-07-07 RX ORDER — AMLODIPINE BESYLATE 5 MG/1
TABLET ORAL
Qty: 90 TABLET | Refills: 0 | Status: SHIPPED | OUTPATIENT
Start: 2021-07-07 | End: 2021-11-30

## 2021-07-07 NOTE — TELEPHONE ENCOUNTER
Date of last refill: 1-4-2021    Is there an Inspect on file: N/A    Last appt date: 1-6-2021     Next appt date: 7-      Additional information:

## 2021-07-26 ENCOUNTER — OFFICE VISIT (OUTPATIENT)
Dept: FAMILY MEDICINE CLINIC | Facility: CLINIC | Age: 66
End: 2021-07-26

## 2021-07-26 VITALS
HEART RATE: 71 BPM | BODY MASS INDEX: 34.16 KG/M2 | HEIGHT: 65 IN | SYSTOLIC BLOOD PRESSURE: 140 MMHG | OXYGEN SATURATION: 100 % | WEIGHT: 205 LBS | DIASTOLIC BLOOD PRESSURE: 84 MMHG | TEMPERATURE: 96.6 F

## 2021-07-26 DIAGNOSIS — E53.8 VITAMIN B12 DEFICIENCY: ICD-10-CM

## 2021-07-26 DIAGNOSIS — E11.69 TYPE 2 DIABETES MELLITUS WITH OTHER SPECIFIED COMPLICATION, UNSPECIFIED WHETHER LONG TERM INSULIN USE (HCC): Primary | ICD-10-CM

## 2021-07-26 DIAGNOSIS — R53.83 OTHER FATIGUE: ICD-10-CM

## 2021-07-26 DIAGNOSIS — E55.9 VITAMIN D DEFICIENCY: ICD-10-CM

## 2021-07-26 DIAGNOSIS — Z20.822 EXPOSURE TO COVID-19 VIRUS: ICD-10-CM

## 2021-07-26 DIAGNOSIS — I10 ESSENTIAL HYPERTENSION: ICD-10-CM

## 2021-07-26 DIAGNOSIS — E78.2 MIXED HYPERLIPIDEMIA: ICD-10-CM

## 2021-07-26 LAB
EXPIRATION DATE: NORMAL
GLUCOSE BLDC GLUCOMTR-MCNC: 151 MG/DL (ref 70–130)
HBA1C MFR BLD: 7.5 %
Lab: NORMAL

## 2021-07-26 PROCEDURE — 80053 COMPREHEN METABOLIC PANEL: CPT | Performed by: NURSE PRACTITIONER

## 2021-07-26 PROCEDURE — 99214 OFFICE O/P EST MOD 30 MIN: CPT | Performed by: NURSE PRACTITIONER

## 2021-07-26 PROCEDURE — 36415 COLL VENOUS BLD VENIPUNCTURE: CPT | Performed by: NURSE PRACTITIONER

## 2021-07-26 PROCEDURE — 82607 VITAMIN B-12: CPT | Performed by: NURSE PRACTITIONER

## 2021-07-26 PROCEDURE — U0003 INFECTIOUS AGENT DETECTION BY NUCLEIC ACID (DNA OR RNA); SEVERE ACUTE RESPIRATORY SYNDROME CORONAVIRUS 2 (SARS-COV-2) (CORONAVIRUS DISEASE [COVID-19]), AMPLIFIED PROBE TECHNIQUE, MAKING USE OF HIGH THROUGHPUT TECHNOLOGIES AS DESCRIBED BY CMS-2020-01-R: HCPCS | Performed by: NURSE PRACTITIONER

## 2021-07-26 PROCEDURE — 85025 COMPLETE CBC W/AUTO DIFF WBC: CPT | Performed by: NURSE PRACTITIONER

## 2021-07-26 PROCEDURE — 80061 LIPID PANEL: CPT | Performed by: NURSE PRACTITIONER

## 2021-07-26 PROCEDURE — 82306 VITAMIN D 25 HYDROXY: CPT | Performed by: NURSE PRACTITIONER

## 2021-07-26 PROCEDURE — 84443 ASSAY THYROID STIM HORMONE: CPT | Performed by: NURSE PRACTITIONER

## 2021-07-26 PROCEDURE — 83036 HEMOGLOBIN GLYCOSYLATED A1C: CPT | Performed by: NURSE PRACTITIONER

## 2021-07-26 PROCEDURE — 84439 ASSAY OF FREE THYROXINE: CPT | Performed by: NURSE PRACTITIONER

## 2021-07-27 LAB
25(OH)D3 SERPL-MCNC: 50.5 NG/ML
ALBUMIN SERPL-MCNC: 4.3 G/DL (ref 3.5–5.2)
ALBUMIN/GLOB SERPL: 2 G/DL
ALP SERPL-CCNC: 57 U/L (ref 39–117)
ALT SERPL W P-5'-P-CCNC: 33 U/L (ref 1–33)
ANION GAP SERPL CALCULATED.3IONS-SCNC: 8.6 MMOL/L (ref 5–15)
AST SERPL-CCNC: 24 U/L (ref 1–32)
BASOPHILS # BLD AUTO: 0.02 10*3/MM3 (ref 0–0.2)
BASOPHILS NFR BLD AUTO: 0.4 % (ref 0–1.5)
BILIRUB SERPL-MCNC: 0.7 MG/DL (ref 0–1.2)
BUN SERPL-MCNC: 12 MG/DL (ref 8–23)
BUN/CREAT SERPL: 17.4 (ref 7–25)
CALCIUM SPEC-SCNC: 9.2 MG/DL (ref 8.6–10.5)
CHLORIDE SERPL-SCNC: 102 MMOL/L (ref 98–107)
CHOLEST SERPL-MCNC: 114 MG/DL (ref 0–200)
CO2 SERPL-SCNC: 27.4 MMOL/L (ref 22–29)
CREAT SERPL-MCNC: 0.69 MG/DL (ref 0.57–1)
DEPRECATED RDW RBC AUTO: 42 FL (ref 37–54)
EOSINOPHIL # BLD AUTO: 0.2 10*3/MM3 (ref 0–0.4)
EOSINOPHIL NFR BLD AUTO: 3.9 % (ref 0.3–6.2)
ERYTHROCYTE [DISTWIDTH] IN BLOOD BY AUTOMATED COUNT: 13.3 % (ref 12.3–15.4)
GFR SERPL CREATININE-BSD FRML MDRD: 85 ML/MIN/1.73
GLOBULIN UR ELPH-MCNC: 2.1 GM/DL
GLUCOSE SERPL-MCNC: 145 MG/DL (ref 65–99)
HCT VFR BLD AUTO: 41.9 % (ref 34–46.6)
HDLC SERPL-MCNC: 93 MG/DL (ref 40–60)
HGB BLD-MCNC: 13.9 G/DL (ref 12–15.9)
IMM GRANULOCYTES # BLD AUTO: 0.01 10*3/MM3 (ref 0–0.05)
IMM GRANULOCYTES NFR BLD AUTO: 0.2 % (ref 0–0.5)
LABCORP SARS-COV-2, NAA 2 DAY TAT: NORMAL
LDLC SERPL CALC-MCNC: 7 MG/DL (ref 0–100)
LDLC/HDLC SERPL: 0.09 {RATIO}
LYMPHOCYTES # BLD AUTO: 1.73 10*3/MM3 (ref 0.7–3.1)
LYMPHOCYTES NFR BLD AUTO: 33.5 % (ref 19.6–45.3)
MCH RBC QN AUTO: 28.4 PG (ref 26.6–33)
MCHC RBC AUTO-ENTMCNC: 33.2 G/DL (ref 31.5–35.7)
MCV RBC AUTO: 85.5 FL (ref 79–97)
MONOCYTES # BLD AUTO: 0.37 10*3/MM3 (ref 0.1–0.9)
MONOCYTES NFR BLD AUTO: 7.2 % (ref 5–12)
NEUTROPHILS NFR BLD AUTO: 2.84 10*3/MM3 (ref 1.7–7)
NEUTROPHILS NFR BLD AUTO: 54.8 % (ref 42.7–76)
NRBC BLD AUTO-RTO: 0 /100 WBC (ref 0–0.2)
PLATELET # BLD AUTO: 169 10*3/MM3 (ref 140–450)
PMV BLD AUTO: 11.3 FL (ref 6–12)
POTASSIUM SERPL-SCNC: 4.7 MMOL/L (ref 3.5–5.2)
PROT SERPL-MCNC: 6.4 G/DL (ref 6–8.5)
RBC # BLD AUTO: 4.9 10*6/MM3 (ref 3.77–5.28)
SARS-COV-2 RNA RESP QL NAA+PROBE: NOT DETECTED
SODIUM SERPL-SCNC: 138 MMOL/L (ref 136–145)
T4 FREE SERPL-MCNC: 1.29 NG/DL (ref 0.93–1.7)
TRIGL SERPL-MCNC: 63 MG/DL (ref 0–150)
TSH SERPL DL<=0.05 MIU/L-ACNC: 3.44 UIU/ML (ref 0.27–4.2)
VIT B12 BLD-MCNC: 493 PG/ML (ref 211–946)
VLDLC SERPL-MCNC: 14 MG/DL (ref 5–40)
WBC # BLD AUTO: 5.17 10*3/MM3 (ref 3.4–10.8)

## 2021-07-30 ENCOUNTER — TELEPHONE (OUTPATIENT)
Dept: FAMILY MEDICINE CLINIC | Facility: CLINIC | Age: 66
End: 2021-07-30

## 2021-07-30 NOTE — TELEPHONE ENCOUNTER
----- Message from JW Tsang sent at 7/29/2021  9:31 AM EDT -----  Negative covid  Is she still taking victoza?  All other labs are stable

## 2021-08-03 DIAGNOSIS — I10 ESSENTIAL HYPERTENSION: ICD-10-CM

## 2021-08-03 RX ORDER — ATORVASTATIN CALCIUM 10 MG/1
TABLET, FILM COATED ORAL
Qty: 90 TABLET | Refills: 1 | Status: SHIPPED | OUTPATIENT
Start: 2021-08-03 | End: 2022-01-28

## 2021-08-03 RX ORDER — LISINOPRIL 10 MG/1
TABLET ORAL
Qty: 90 TABLET | Refills: 0 | Status: SHIPPED | OUTPATIENT
Start: 2021-08-03 | End: 2021-11-01

## 2021-08-27 DIAGNOSIS — E11.69 TYPE 2 DIABETES MELLITUS WITH OTHER SPECIFIED COMPLICATION, UNSPECIFIED WHETHER LONG TERM INSULIN USE (HCC): ICD-10-CM

## 2021-08-27 RX ORDER — METFORMIN HYDROCHLORIDE 500 MG/1
TABLET, EXTENDED RELEASE ORAL
Qty: 180 TABLET | Refills: 0 | Status: SHIPPED | OUTPATIENT
Start: 2021-08-27 | End: 2021-11-22

## 2021-09-28 DIAGNOSIS — E11.69 TYPE 2 DIABETES MELLITUS WITH OTHER SPECIFIED COMPLICATION, UNSPECIFIED WHETHER LONG TERM INSULIN USE (HCC): ICD-10-CM

## 2021-09-28 RX ORDER — PEN NEEDLE, DIABETIC 32GX 5/32"
NEEDLE, DISPOSABLE MISCELLANEOUS
Qty: 100 EACH | Refills: 1 | Status: SHIPPED | OUTPATIENT
Start: 2021-09-28

## 2021-09-28 NOTE — TELEPHONE ENCOUNTER
Rx Refill Note  Requested Prescriptions     Pending Prescriptions Disp Refills   • BD Pen Needle Jennifer 2nd Gen 32G X 4 MM misc [Pharmacy Med Name: BD JENNIFER 2 GEN PEN NDL [id number]] 100 each 1     Sig: USE AS DIRECTED FOR INSULIN INJECTIONS      Last office visit with prescribing clinician:    7-   Next office visit with prescribing clinician: NONE            Tina Poe LPN  09/28/21, 10:34 EDT

## 2021-10-30 DIAGNOSIS — I10 ESSENTIAL HYPERTENSION: ICD-10-CM

## 2021-11-01 RX ORDER — LISINOPRIL 10 MG/1
TABLET ORAL
Qty: 90 TABLET | Refills: 0 | Status: SHIPPED | OUTPATIENT
Start: 2021-11-01 | End: 2021-12-29 | Stop reason: SDUPTHER

## 2021-11-01 NOTE — TELEPHONE ENCOUNTER
Rx Refill Note    PROTOCOLS NOT MET     Requested Prescriptions     Pending Prescriptions Disp Refills   • lisinopril (PRINIVIL,ZESTRIL) 10 MG tablet [Pharmacy Med Name: LISINOPRIL 10 MG TABLET] 90 tablet 0     Sig: TAKE 1 TABLET BY MOUTH EVERY DAY      Last office visit with prescribing clinician: 7/26/2021      Next office visit with prescribing clinician: NONE     Comprehensive Metabolic Panel (07/26/2021 10:30)         Tina Poe LPN  11/01/21, 08:07 EDT

## 2021-11-08 NOTE — TELEPHONE ENCOUNTER
Rx Refill Note  Requested Prescriptions     Pending Prescriptions Disp Refills   • bimatoprost (LATISSE) 0.03 % ophthalmic solution [Pharmacy Med Name: BIMATOPROST 0.03% EYELASH SOLN] 3 mL 1     Sig: PLACE 1 DROP ON APPLICATOR AND APPLY ALONG SKIN OF UPPER EYELID AT BASE OF EYELASHES DAILY AT BEDTIME REPEAT FOR 2ND EYE WITH CLEAN APPLICATOR      Last office visit with prescribing clinician: 7- CLEMENCIA/ DON  Next office visit with prescribing clinician: NONE    Office Visit with Don Mendoza APRN (07/26/2021)  Office Visit with Jenna Vallecillo MD (11/10/2020)         Heber Sylvester CMA  11/08/21, 09:45 EST

## 2021-11-09 RX ORDER — BIMATOPROST 3 UG/ML
SOLUTION TOPICAL
Qty: 3 ML | Refills: 1 | Status: SHIPPED | OUTPATIENT
Start: 2021-11-09

## 2021-11-20 ENCOUNTER — TELEPHONE (OUTPATIENT)
Dept: FAMILY MEDICINE CLINIC | Facility: CLINIC | Age: 66
End: 2021-11-20

## 2021-11-20 DIAGNOSIS — E11.69 TYPE 2 DIABETES MELLITUS WITH OTHER SPECIFIED COMPLICATION, UNSPECIFIED WHETHER LONG TERM INSULIN USE: ICD-10-CM

## 2021-11-22 RX ORDER — AMLODIPINE BESYLATE AND ATORVASTATIN CALCIUM 5; 10 MG/1; MG/1
TABLET, FILM COATED ORAL
Qty: 90 TABLET | Refills: 1 | OUTPATIENT
Start: 2021-11-22

## 2021-11-22 RX ORDER — METFORMIN HYDROCHLORIDE 500 MG/1
TABLET, EXTENDED RELEASE ORAL
Qty: 180 TABLET | Refills: 0 | Status: SHIPPED | OUTPATIENT
Start: 2021-11-22 | End: 2021-12-21 | Stop reason: SDUPTHER

## 2021-11-22 NOTE — TELEPHONE ENCOUNTER
Rx Refill Note  Requested Prescriptions     Pending Prescriptions Disp Refills   • metFORMIN ER (GLUCOPHAGE-XR) 500 MG 24 hr tablet [Pharmacy Med Name: METFORMIN HCL  MG TABLET] 180 tablet 0     Sig: TAKE 1 TABLET BY MOUTH TWICE A DAY WITH MEALS      Last office visit with prescribing clinician: 7/26/2021      Next office visit with prescribing clinician: Visit date not found            Tina Poe LPN  11/22/21, 08:50 EST

## 2021-11-26 DIAGNOSIS — E11.69 TYPE 2 DIABETES MELLITUS WITH OTHER SPECIFIED COMPLICATION, UNSPECIFIED WHETHER LONG TERM INSULIN USE (HCC): ICD-10-CM

## 2021-11-29 RX ORDER — LIRAGLUTIDE 6 MG/ML
INJECTION SUBCUTANEOUS
Qty: 27 PEN | Refills: 2 | Status: SHIPPED | OUTPATIENT
Start: 2021-11-29 | End: 2021-12-27 | Stop reason: ALTCHOICE

## 2021-11-30 RX ORDER — AMLODIPINE BESYLATE 5 MG/1
TABLET ORAL
Qty: 90 TABLET | Refills: 0 | Status: SHIPPED | OUTPATIENT
Start: 2021-11-30 | End: 2022-02-14

## 2021-11-30 NOTE — TELEPHONE ENCOUNTER
Rx Refill Note  Requested Prescriptions     Pending Prescriptions Disp Refills   • amLODIPine (NORVASC) 5 MG tablet [Pharmacy Med Name: AMLODIPINE BESYLATE 5 MG TAB] 90 tablet 0     Sig: TAKE 1 TABLET BY MOUTH EVERY DAY      Last office visit with prescribing clinician: 7/26/2021      Next office visit with prescribing clinician: 12/8/2021     Office Visit with Kayla Mendoza APRN (07/26/2021)  SARS-CoV-2, SKYE 2 DAY TAT - Swab, Nasopharynx (07/26/2021 12:08)  POCT glycated hemoglobin, total (07/26/2021 12:05)  Vitamin D 25 Hydroxy (07/26/2021 10:30)  Vitamin B12 (07/26/2021 10:30)  CBC & Differential (07/26/2021 10:30)  T4, Free (07/26/2021 10:30)  TSH (07/26/2021 10:30)  Lipid Panel (07/26/2021 10:30)  Comprehensive Metabolic Panel (07/26/2021 10:30)         Heber Sylvester CMA  11/30/21, 07:58 EST

## 2021-12-21 ENCOUNTER — OFFICE VISIT (OUTPATIENT)
Dept: FAMILY MEDICINE CLINIC | Facility: CLINIC | Age: 66
End: 2021-12-21

## 2021-12-21 VITALS
HEIGHT: 65 IN | BODY MASS INDEX: 34.82 KG/M2 | SYSTOLIC BLOOD PRESSURE: 140 MMHG | DIASTOLIC BLOOD PRESSURE: 86 MMHG | HEART RATE: 73 BPM | TEMPERATURE: 97.5 F | WEIGHT: 209 LBS | OXYGEN SATURATION: 100 %

## 2021-12-21 DIAGNOSIS — E55.9 VITAMIN D DEFICIENCY: ICD-10-CM

## 2021-12-21 DIAGNOSIS — R30.0 DYSURIA: ICD-10-CM

## 2021-12-21 DIAGNOSIS — E11.69 TYPE 2 DIABETES MELLITUS WITH OTHER SPECIFIED COMPLICATION, UNSPECIFIED WHETHER LONG TERM INSULIN USE: Primary | ICD-10-CM

## 2021-12-21 DIAGNOSIS — I10 ESSENTIAL HYPERTENSION: ICD-10-CM

## 2021-12-21 DIAGNOSIS — R53.83 OTHER FATIGUE: ICD-10-CM

## 2021-12-21 DIAGNOSIS — E78.2 MIXED HYPERLIPIDEMIA: ICD-10-CM

## 2021-12-21 DIAGNOSIS — E53.8 VITAMIN B12 DEFICIENCY: ICD-10-CM

## 2021-12-21 LAB
EXPIRATION DATE: NORMAL
GLUCOSE BLDC GLUCOMTR-MCNC: 170 MG/DL (ref 70–130)
HBA1C MFR BLD: 9.1 %
Lab: NORMAL

## 2021-12-21 PROCEDURE — 82607 VITAMIN B-12: CPT | Performed by: NURSE PRACTITIONER

## 2021-12-21 PROCEDURE — 99214 OFFICE O/P EST MOD 30 MIN: CPT | Performed by: NURSE PRACTITIONER

## 2021-12-21 PROCEDURE — 3046F HEMOGLOBIN A1C LEVEL >9.0%: CPT | Performed by: NURSE PRACTITIONER

## 2021-12-21 PROCEDURE — 85025 COMPLETE CBC W/AUTO DIFF WBC: CPT | Performed by: NURSE PRACTITIONER

## 2021-12-21 PROCEDURE — 82043 UR ALBUMIN QUANTITATIVE: CPT | Performed by: NURSE PRACTITIONER

## 2021-12-21 PROCEDURE — 82306 VITAMIN D 25 HYDROXY: CPT | Performed by: NURSE PRACTITIONER

## 2021-12-21 PROCEDURE — 81001 URINALYSIS AUTO W/SCOPE: CPT | Performed by: NURSE PRACTITIONER

## 2021-12-21 PROCEDURE — 84443 ASSAY THYROID STIM HORMONE: CPT | Performed by: NURSE PRACTITIONER

## 2021-12-21 PROCEDURE — 82962 GLUCOSE BLOOD TEST: CPT | Performed by: NURSE PRACTITIONER

## 2021-12-21 PROCEDURE — 36415 COLL VENOUS BLD VENIPUNCTURE: CPT | Performed by: NURSE PRACTITIONER

## 2021-12-21 PROCEDURE — 80053 COMPREHEN METABOLIC PANEL: CPT | Performed by: NURSE PRACTITIONER

## 2021-12-21 PROCEDURE — 83036 HEMOGLOBIN GLYCOSYLATED A1C: CPT | Performed by: NURSE PRACTITIONER

## 2021-12-21 PROCEDURE — 80061 LIPID PANEL: CPT | Performed by: NURSE PRACTITIONER

## 2021-12-21 PROCEDURE — 84439 ASSAY OF FREE THYROXINE: CPT | Performed by: NURSE PRACTITIONER

## 2021-12-21 RX ORDER — METFORMIN HYDROCHLORIDE 500 MG/1
1000 TABLET, EXTENDED RELEASE ORAL 2 TIMES DAILY WITH MEALS
Qty: 180 TABLET | Refills: 0 | Status: SHIPPED | OUTPATIENT
Start: 2021-12-21 | End: 2021-12-29

## 2021-12-22 LAB
25(OH)D3 SERPL-MCNC: 53.3 NG/ML (ref 30–100)
ALBUMIN SERPL-MCNC: 4.4 G/DL (ref 3.5–5.2)
ALBUMIN UR-MCNC: <1.2 MG/DL
ALBUMIN/GLOB SERPL: 1.7 G/DL
ALP SERPL-CCNC: 61 U/L (ref 39–117)
ALT SERPL W P-5'-P-CCNC: 52 U/L (ref 1–33)
ANION GAP SERPL CALCULATED.3IONS-SCNC: 10.6 MMOL/L (ref 5–15)
AST SERPL-CCNC: 34 U/L (ref 1–32)
BACTERIA UR QL AUTO: NORMAL /HPF
BASOPHILS # BLD AUTO: 0.03 10*3/MM3 (ref 0–0.2)
BASOPHILS NFR BLD AUTO: 0.6 % (ref 0–1.5)
BILIRUB SERPL-MCNC: 0.7 MG/DL (ref 0–1.2)
BILIRUB UR QL STRIP: NEGATIVE
BUN SERPL-MCNC: 13 MG/DL (ref 8–23)
BUN/CREAT SERPL: 20.3 (ref 7–25)
CALCIUM SPEC-SCNC: 9.3 MG/DL (ref 8.6–10.5)
CHLORIDE SERPL-SCNC: 103 MMOL/L (ref 98–107)
CHOLEST SERPL-MCNC: 103 MG/DL (ref 0–200)
CLARITY UR: ABNORMAL
CO2 SERPL-SCNC: 25.4 MMOL/L (ref 22–29)
COLOR UR: ABNORMAL
CREAT SERPL-MCNC: 0.64 MG/DL (ref 0.57–1)
DEPRECATED RDW RBC AUTO: 40.3 FL (ref 37–54)
EOSINOPHIL # BLD AUTO: 0.16 10*3/MM3 (ref 0–0.4)
EOSINOPHIL NFR BLD AUTO: 3 % (ref 0.3–6.2)
ERYTHROCYTE [DISTWIDTH] IN BLOOD BY AUTOMATED COUNT: 13.1 % (ref 12.3–15.4)
GFR SERPL CREATININE-BSD FRML MDRD: 93 ML/MIN/1.73
GLOBULIN UR ELPH-MCNC: 2.6 GM/DL
GLUCOSE SERPL-MCNC: 168 MG/DL (ref 65–99)
GLUCOSE UR STRIP-MCNC: ABNORMAL MG/DL
HCT VFR BLD AUTO: 42.8 % (ref 34–46.6)
HDLC SERPL-MCNC: 86 MG/DL (ref 40–60)
HGB BLD-MCNC: 14 G/DL (ref 12–15.9)
HGB UR QL STRIP.AUTO: NEGATIVE
HYALINE CASTS UR QL AUTO: NORMAL /LPF
IMM GRANULOCYTES # BLD AUTO: 0.01 10*3/MM3 (ref 0–0.05)
IMM GRANULOCYTES NFR BLD AUTO: 0.2 % (ref 0–0.5)
KETONES UR QL STRIP: ABNORMAL
LDLC SERPL CALC-MCNC: <5 MG/DL (ref 0–100)
LDLC/HDLC SERPL: 0.05 {RATIO}
LEUKOCYTE ESTERASE UR QL STRIP.AUTO: ABNORMAL
LYMPHOCYTES # BLD AUTO: 1.95 10*3/MM3 (ref 0.7–3.1)
LYMPHOCYTES NFR BLD AUTO: 36.2 % (ref 19.6–45.3)
MCH RBC QN AUTO: 27.7 PG (ref 26.6–33)
MCHC RBC AUTO-ENTMCNC: 32.7 G/DL (ref 31.5–35.7)
MCV RBC AUTO: 84.8 FL (ref 79–97)
MONOCYTES # BLD AUTO: 0.43 10*3/MM3 (ref 0.1–0.9)
MONOCYTES NFR BLD AUTO: 8 % (ref 5–12)
NEUTROPHILS NFR BLD AUTO: 2.8 10*3/MM3 (ref 1.7–7)
NEUTROPHILS NFR BLD AUTO: 52 % (ref 42.7–76)
NITRITE UR QL STRIP: NEGATIVE
NRBC BLD AUTO-RTO: 0 /100 WBC (ref 0–0.2)
PH UR STRIP.AUTO: 5.5 [PH] (ref 5–8)
PLATELET # BLD AUTO: 156 10*3/MM3 (ref 140–450)
PMV BLD AUTO: 11.2 FL (ref 6–12)
POTASSIUM SERPL-SCNC: 4.7 MMOL/L (ref 3.5–5.2)
PROT SERPL-MCNC: 7 G/DL (ref 6–8.5)
PROT UR QL STRIP: NEGATIVE
RBC # BLD AUTO: 5.05 10*6/MM3 (ref 3.77–5.28)
RBC # UR STRIP: NORMAL /HPF
REF LAB TEST METHOD: NORMAL
SODIUM SERPL-SCNC: 139 MMOL/L (ref 136–145)
SP GR UR STRIP: 1.03 (ref 1–1.03)
SQUAMOUS #/AREA URNS HPF: NORMAL /HPF
T4 FREE SERPL-MCNC: 1.46 NG/DL (ref 0.93–1.7)
TRIGL SERPL-MCNC: 65 MG/DL (ref 0–150)
TSH SERPL DL<=0.05 MIU/L-ACNC: 3.6 UIU/ML (ref 0.27–4.2)
UROBILINOGEN UR QL STRIP: ABNORMAL
VIT B12 BLD-MCNC: 527 PG/ML (ref 211–946)
VLDLC SERPL-MCNC: ABNORMAL MG/DL
WBC # UR STRIP: NORMAL /HPF
WBC NRBC COR # BLD: 5.38 10*3/MM3 (ref 3.4–10.8)

## 2021-12-23 ENCOUNTER — TELEPHONE (OUTPATIENT)
Dept: FAMILY MEDICINE CLINIC | Facility: CLINIC | Age: 66
End: 2021-12-23

## 2021-12-27 ENCOUNTER — TELEPHONE (OUTPATIENT)
Dept: FAMILY MEDICINE CLINIC | Facility: CLINIC | Age: 66
End: 2021-12-27

## 2021-12-27 RX ORDER — DULAGLUTIDE 0.75 MG/.5ML
0.5 INJECTION, SOLUTION SUBCUTANEOUS
Qty: 6 ML | Refills: 0 | Status: SHIPPED | OUTPATIENT
Start: 2021-12-27

## 2021-12-27 NOTE — TELEPHONE ENCOUNTER
Hub is instructed to read the documentation below to patient  RX FOR TRULICITY 0.75 MG SENT TO [facility] IN [place] AS PATIENT REQUESTED.

## 2021-12-27 NOTE — TELEPHONE ENCOUNTER
Caller: Jack Hidalgo    Relationship: Self    Best call back number: [phone]    What medication are you requesting: TRULICITY    What are your current symptoms:     How long have you been experiencing symptoms:    Have you had these symptoms before:    [x] Yes  [] No    Have you been treated for these symptoms before:   [x] Yes  [] No    If a prescription is needed, what is your preferred pharmacy and phone number:  [facility]  [address]  [phone]    Additional notes: PATIENT WANTS TO KNOW IF DR CUELLO CAN CALL IN A PRESCRIPTION FOR TRULICITY FOR HER.

## 2021-12-29 DIAGNOSIS — E11.69 TYPE 2 DIABETES MELLITUS WITH OTHER SPECIFIED COMPLICATION, UNSPECIFIED WHETHER LONG TERM INSULIN USE (HCC): ICD-10-CM

## 2021-12-29 DIAGNOSIS — I10 ESSENTIAL HYPERTENSION: ICD-10-CM

## 2021-12-29 RX ORDER — LEVOTHYROXINE SODIUM 112 UG/1
TABLET ORAL
Qty: 90 TABLET | Refills: 1 | Status: SHIPPED | OUTPATIENT
Start: 2021-12-29

## 2021-12-29 RX ORDER — LISINOPRIL 10 MG/1
TABLET ORAL
Qty: 90 TABLET | Refills: 0 | Status: SHIPPED | OUTPATIENT
Start: 2021-12-29

## 2021-12-29 RX ORDER — METFORMIN HYDROCHLORIDE 500 MG/1
TABLET, EXTENDED RELEASE ORAL
Qty: 180 TABLET | Refills: 0 | Status: SHIPPED | OUTPATIENT
Start: 2021-12-29 | End: 2022-05-04

## 2021-12-29 NOTE — TELEPHONE ENCOUNTER
Rx Refill Note    PROTOCOLS NOT MET     Requested Prescriptions     Pending Prescriptions Disp Refills   • lisinopril (PRINIVIL,ZESTRIL) 10 MG tablet [Pharmacy Med Name: LISINOPRIL 10 MG TABLET] 90 tablet 0     Sig: TAKE 1 TABLET BY MOUTH EVERY DAY     Signed Prescriptions Disp Refills   • metFORMIN ER (GLUCOPHAGE-XR) 500 MG 24 hr tablet 180 tablet 0     Sig: TAKE 1 TABLET BY MOUTH TWICE A DAY WITH MEALS     Authorizing Provider: DON CUELLO     Ordering User: TINA ELIAS      Last office visit with prescribing clinician: 12/21/2021      Next office visit with prescribing clinician: 3/21/2022     Comprehensive Metabolic Panel (12/21/2021 10:59)         Tina Elias LPN  12/29/21, 15:05 EST

## 2022-01-21 ENCOUNTER — TELEPHONE (OUTPATIENT)
Dept: FAMILY MEDICINE CLINIC | Facility: CLINIC | Age: 67
End: 2022-01-21

## 2022-01-21 PROCEDURE — 87086 URINE CULTURE/COLONY COUNT: CPT | Performed by: NURSE PRACTITIONER

## 2022-01-21 RX ORDER — SULFAMETHOXAZOLE AND TRIMETHOPRIM 800; 160 MG/1; MG/1
1 TABLET ORAL 2 TIMES DAILY
Qty: 20 TABLET | Refills: 0 | Status: SHIPPED | OUTPATIENT
Start: 2022-01-21 | End: 2022-01-31

## 2022-01-21 NOTE — TELEPHONE ENCOUNTER
PATIENT WAS SEEN BY DR. FRANCIS (SURGEON WHO PLACED HER MESH), AND SHE STATED THAT HE ORDERED A CT SCAN WITH CONTRAST, AND SEEN THAT SHE HAD A UTI ON THAT. SHE SAID THAT HE DID NOT OBTAIN A URINE SPECIMEN FROM HER. SHE WILL BE HERE AT 1130 THIS AM TO PROVIDE ONE. WILL REQUEST RECORDS FROM DR. FRANCIS'S OFFICE.

## 2022-01-21 NOTE — TELEPHONE ENCOUNTER
She needs to drop off a urine sample. I will send medication  I'm unsure about seeing the  UTI on  a CT I can review the CT scan once we receive it

## 2022-01-21 NOTE — TELEPHONE ENCOUNTER
PATIENT WAS SEEN AT DR VEENA FRANCIS AT [facility], HAD A CT SCAN DONE BECAUSE WAS HAVING A LOT OF BOWEL/BLADDER DISCOMFORT, AND WAS TOLD WHAT SHE HAD WAS AS SEVERE UTI    PATIENT REQUESTED SOME MEDICATION TO HELP HER WITH SYMPTOMS UNTIL CAN BE SEEN BY JOSEPHINE    HER PHARMACY IS [facility] IN [place]

## 2022-01-21 NOTE — TELEPHONE ENCOUNTER
She will be dropping off her specimen at 1130 this morning. Called and requested records from Dr. Riley's office. They will be on their way shortly.

## 2022-01-24 ENCOUNTER — TELEPHONE (OUTPATIENT)
Dept: FAMILY MEDICINE CLINIC | Facility: CLINIC | Age: 67
End: 2022-01-24

## 2022-01-24 NOTE — TELEPHONE ENCOUNTER
PATIENTS  EDITH IS CALLING IN STATING THAT THE PATIENT IS CURRENTLY AT THE ER. [facility] EMERGENCY DEPARTMENT IS ASKING PATIENT FOR HER LAST URINE TEST RESULTS THAT THE PATIENT HAD WITH DR CUELLO.      EDITH CALL BACK  [phone]

## 2022-01-25 ENCOUNTER — TELEPHONE (OUTPATIENT)
Dept: FAMILY MEDICINE CLINIC | Facility: CLINIC | Age: 67
End: 2022-01-25

## 2022-01-25 NOTE — TELEPHONE ENCOUNTER
PATIENT IS CALLING BECAUSE SHE HAD A URINE CULTURE DONE AT THE HOSPITAL YESTERDAY AND THEY WILL GIVE RESULTS TO OFFICE SINCE SHE NEEDED ANOTHER CULTURE.     CALL: [phone]

## 2022-01-28 RX ORDER — ATORVASTATIN CALCIUM 10 MG/1
TABLET, FILM COATED ORAL
Qty: 90 TABLET | Refills: 1 | Status: SHIPPED | OUTPATIENT
Start: 2022-01-28

## 2022-02-14 RX ORDER — AMLODIPINE BESYLATE 5 MG/1
TABLET ORAL
Qty: 90 TABLET | Refills: 0 | Status: SHIPPED | OUTPATIENT
Start: 2022-02-14 | End: 2022-05-16

## 2022-05-04 DIAGNOSIS — E11.69 TYPE 2 DIABETES MELLITUS WITH OTHER SPECIFIED COMPLICATION, UNSPECIFIED WHETHER LONG TERM INSULIN USE: ICD-10-CM

## 2022-05-04 RX ORDER — METFORMIN HYDROCHLORIDE 500 MG/1
TABLET, EXTENDED RELEASE ORAL
Qty: 180 TABLET | Refills: 0 | Status: SHIPPED | OUTPATIENT
Start: 2022-05-04

## 2022-05-16 RX ORDER — AMLODIPINE BESYLATE 5 MG/1
TABLET ORAL
Qty: 90 TABLET | Refills: 0 | Status: SHIPPED | OUTPATIENT
Start: 2022-05-16

## 2022-05-16 NOTE — TELEPHONE ENCOUNTER
Rx Refill Note    PROTOCOLS NOT MET.     Requested Prescriptions     Pending Prescriptions Disp Refills   • amLODIPine (NORVASC) 5 MG tablet [Pharmacy Med Name: AMLODIPINE BESYLATE 5 MG TAB] 90 tablet 0     Sig: TAKE 1 TABLET BY MOUTH EVERY DAY      Last office visit with prescribing clinician: 12/21/2021      Next office visit with prescribing clinician: Visit date not found     CBC & Differential (12/21/2021 10:59)  Comprehensive Metabolic Panel (12/21/2021 10:59)         Tina Poe LPN  05/16/22, 13:09 EDT

## 2022-09-12 NOTE — TELEPHONE ENCOUNTER
----- Message from JW Tsang sent at 9/10/2020  9:09 PM EDT -----  Recommend repeat CBC for f/u next week  In office if possible mildly low platelets    
CALLED PATIENT. NO ANSWER. PER HIPAA, MAY LEAVE DETAILED VM. VM LEFT ADVISING PT OF THE ABOVE AND TO CONTACT OFFICE TO SET  UP REPEAT CBC LAB DRAW APPT NEXT WEEK.   
Patient returned phone call to office.  Wants to come in today for repeat CBC for mildly low platelets. Repeat lab order placed. Appt scheduled for pt to repeat CBC this day as well.   
[facility]

## 2023-04-05 NOTE — TELEPHONE ENCOUNTER
Discussed with patient and her  lab results of hypercalcemia and hyperparathyroidism.  Patient will be referred to Dr. Rodriguez.  
None

## 2025-01-09 ENCOUNTER — OFFICE (AMBULATORY)
Dept: URBAN - METROPOLITAN AREA CLINIC 64 | Facility: CLINIC | Age: 70
End: 2025-01-09
Payer: COMMERCIAL

## 2025-01-09 VITALS
WEIGHT: 205 LBS | SYSTOLIC BLOOD PRESSURE: 197 MMHG | DIASTOLIC BLOOD PRESSURE: 102 MMHG | SYSTOLIC BLOOD PRESSURE: 173 MMHG | HEART RATE: 77 BPM | DIASTOLIC BLOOD PRESSURE: 110 MMHG | HEIGHT: 65 IN

## 2025-01-09 DIAGNOSIS — Z86.0101 PERSONAL HISTORY OF ADENOMATOUS AND SERRATED COLON POLYPS: ICD-10-CM

## 2025-01-09 DIAGNOSIS — K59.00 CONSTIPATION, UNSPECIFIED: ICD-10-CM

## 2025-01-09 DIAGNOSIS — Z80.0 FAMILY HISTORY OF MALIGNANT NEOPLASM OF DIGESTIVE ORGANS: ICD-10-CM

## 2025-01-09 DIAGNOSIS — K64.4 RESIDUAL HEMORRHOIDAL SKIN TAGS: ICD-10-CM

## 2025-01-09 PROCEDURE — 99204 OFFICE O/P NEW MOD 45 MIN: CPT | Performed by: NURSE PRACTITIONER

## 2025-04-11 ENCOUNTER — OFFICE (AMBULATORY)
Dept: URBAN - METROPOLITAN AREA CLINIC 64 | Facility: CLINIC | Age: 70
End: 2025-04-11
Payer: MEDICARE

## 2025-04-11 VITALS
HEIGHT: 65 IN | HEART RATE: 66 BPM | SYSTOLIC BLOOD PRESSURE: 141 MMHG | WEIGHT: 201 LBS | DIASTOLIC BLOOD PRESSURE: 84 MMHG

## 2025-04-11 DIAGNOSIS — K64.8 OTHER HEMORRHOIDS: ICD-10-CM

## 2025-04-11 DIAGNOSIS — K59.00 CONSTIPATION, UNSPECIFIED: ICD-10-CM

## 2025-04-11 PROCEDURE — 99213 OFFICE O/P EST LOW 20 MIN: CPT | Performed by: NURSE PRACTITIONER
